# Patient Record
Sex: MALE | Race: WHITE | Employment: OTHER | URBAN - METROPOLITAN AREA
[De-identification: names, ages, dates, MRNs, and addresses within clinical notes are randomized per-mention and may not be internally consistent; named-entity substitution may affect disease eponyms.]

---

## 2017-01-21 ENCOUNTER — HOSPITAL ENCOUNTER (OUTPATIENT)
Dept: GENERAL RADIOLOGY | Age: 72
Discharge: HOME | End: 2017-01-21
Payer: MEDICARE

## 2017-01-21 DIAGNOSIS — M54.2 NECK PAIN: ICD-10-CM

## 2017-01-21 PROCEDURE — 72050 X-RAY EXAM NECK SPINE 4/5VWS: CPT

## 2017-01-23 NOTE — PROGRESS NOTES
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 I think PT can help him, but if symptoms worsen, need to look into an MRI.

## 2017-01-23 NOTE — PROGRESS NOTES
Went over results with pt and he expressed understanding. He stated he has been scheduled with pt office.

## 2017-01-26 ENCOUNTER — HOSPITAL ENCOUNTER (OUTPATIENT)
Dept: PHYSICAL THERAPY | Age: 72
Discharge: HOME | End: 2017-01-26
Payer: MEDICARE

## 2017-01-26 DIAGNOSIS — M54.2 NECK PAIN: ICD-10-CM

## 2017-01-26 PROCEDURE — 97140 MANUAL THERAPY 1/> REGIONS: CPT

## 2017-01-26 PROCEDURE — G8982 BODY POS GOAL STATUS: HCPCS

## 2017-01-26 PROCEDURE — 97161 PT EVAL LOW COMPLEX 20 MIN: CPT

## 2017-01-26 PROCEDURE — 97110 THERAPEUTIC EXERCISES: CPT

## 2017-01-26 PROCEDURE — G8981 BODY POS CURRENT STATUS: HCPCS

## 2017-01-26 NOTE — PROGRESS NOTES
Ambulatory/Rehab Services H2 Model Falls Risk Assessment    Risk Factor Pts. ·   Confusion/Disorientation/Impulsivity  []    4 ·   Symptomatic Depression  []   2 ·   Altered Elimination  []   1 ·   Dizziness/Vertigo  []   1 ·   Gender (Male)  [x]   1 ·   Any administered antiepileptics (anticonvulsants):  []   2 ·   Any administered benzodiazepines:  []   1 ·   Visual Impairment (specify):  []   1 ·   Portable Oxygen Use  []   1 ·   Orthostatic ? BP  []   1 ·   History of Recent Falls (within 3 mos.)  []   5     Ability to Rise from Chair (choose one) Pts. ·   Ability to rise in a single movement  []   0 ·   Pushes up, successful in one attempt  [x]   1 ·   Multiple attempts, but successful  []   3 ·   Unable to rise without assistance  []   4   Total: (5 or greater = High Risk) 2     Falls Prevention Plan:   []                Physical Limitations to Exercise (specify):   []                Mobility Assistance Device (type):   []                Exercise/Equipment Adaptation (specify):    ©2010 [facility] [address] Patent #[id number].  Federal Law prohibits the replication, distribution or use without written permission from [facility]

## 2017-01-26 NOTE — PROGRESS NOTES
Eddie Rock  :  [facility] at [address], [place], [address] [place] [address]  [phone]   [phone]         OUTPATIENT PHYSICAL THERAPY:Initial Assessment 2017    ICD-10: Treatment Diagnosis 1: neck pain (M54.2)              Treatment Diagnosis 2: cervical radiculopathy (M54.12)  Precautions/Allergies:   Percocet [oxycodone-acetaminophen]   Fall Risk Score: 2 (? 5 = High Risk)  MD Orders: Evaluate and Treat MEDICAL/REFERRING DIAGNOSIS:  Neck pain [M54.2]   DATE OF ONSET: beginning of 2017  REFERRING PHYSICIAN: Chelsea Combs NP  RETURN PHYSICIAN APPOINTMENT: not scheduled     INITIAL ASSESSMENT:  Mr. José Pak is a 66year old male presenting with neck pain and radicular pain into the top of his head and down to his shoulder that started in the beginning of 2017. He reported that he has been working on cars in a shop at home on a lift, which requires him to look up for long periods of time. He reports that looking up, especially for long periods of time or into full extension creates his neck and radiating pain the most.  He denies weakness and tingling at this time, but reports that he has been unable to work on cars like he used to due to the pain. He is eager to improve tolerance to this activity, as well as any other activities requiring extended extension of the neck and head. He is a good candidate for skilled intervention with services to include manual therapy, modalities as needed, therapeutic exercises, postural re-education and activity modification. PROBLEM LIST (Impacting functional limitations):  1. Decreased Strength  2. Decreased ADL/Functional Activities  3. Decreased Transfer Abilities  4. Decreased Activity Tolerance  5. Increased Fatigue  6. Decreased Flexibility/Joint Mobility INTERVENTIONS PLANNED:  1. Cold  2. Electrical Stimulation  3. Heat  4. Home Exercise Program (HEP)  5.  Manual Therapy  6. Neuromuscular Re-education/Strengthening  7. Range of Motion (ROM)  8. Therapeutic Exercise/Strengthening  9. Ultrasound (US)   TREATMENT PLAN:  Effective Dates: 1/26/2017 TO 3/9/2017. Frequency/Duration: 2 times a week for 6 weeks  GOALS: (Goals have been discussed and agreed upon with patient.)  Short-Term Functional Goals: Time Frame: 1/26/2017 to 2/16/2017  1. Patient demonstrates independence with home exercise program without verbal cueing provided by therapist.  2. Improve left arm and head radicular symptoms with performance of HEP and use of traction. 3. Improve posture with decreased forward head, forward shoulders, and thoracic kyphosis in sitting and standing. Discharge Goals: Time Frame: 1/26/2017 to 3/9/2017  1. Improve pain to 3/10 at the most with working on his cars at home, looking up for house repairs, and sleeping. 2. Improve strength of deep cervical flexors, scapular retractors, and thoracic spinal extensors to at least 4/5 in order to improve tolerance to working on cars at home. 3. Improve form with lifting/carrying/pushing/pulling activities, as well as neutral spine with overhead activities. 4. Improve tenderness to palpation and spasm of left cervical paraspinals, suboccipitals, and scalenes in order to decrease pain with sleeping. 5. Improve tolerance to looking up working on cars for up to 10 minutes before the need to rest due to neck pain. 6. Improve Neck Disability Index score to 2/50 from 8/50. Rehabilitation Potential For Stated Goals: Good  Regarding Rochell Apley Holcombe's therapy, I certify that the treatment plan above will be carried out by a therapist or under their direction. Thank you for this referral,  Nohemi Dee PT     Referring Physician Signature: Maite Grier NP              Date                    The information in this section was collected on 1/26/2017 (except where otherwise noted).   HISTORY:   History of Present Injury/Illness (Reason for Referral):  Mr. Dereck Gandhi is a 66year old male presenting with neck pain and radicular pain into the top of his head and down to his shoulder that started in the beginning of January 2017. He reported that he has been working on cars in a shop at home on a lift, which requires him to look up for long periods of time. He reports that looking up, especially for long periods of time or into full extension creates his neck and radiating pain the most.  He denies weakness and tingling at this time, but reports that he has been unable to work on cars like he used to due to the pain. He is eager to improve tolerance to this activity, as well as any other activities requiring extended extension of the neck and head. Past Medical History/Comorbidities:   Mr. Dereck Gandhi  has a past medical history of Cancer ([facility] [address] (2006); Chronic maxillary sinusitis (8/14/2015); Diverticulitis; H/O colostomy (11/2012); and Microscopic hematuria (8/14/2015). He also has no past medical history of Abuse or Headache(784.0). Mr. Dereck Gandhi  has a past surgical history that includes colostomy (11/2012); hernia repair (1970s); colostomy take down (1/31/13); tonsillectomy; carpal tunnel release; appendectomy (11/2012); and other surgical (4/2014). Social History/Living Environment:    Patient lives at home with wife and reports general independence to modified independence with household chores and ADLs. Requires assistance with moderate to heavy lifting and overhead activities due to neck pain. Prior Level of Function/Work/Activity:  Independent without dysfunction. 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 Dominant Side:         RIGHT  Current Medications:       Current Outpatient Prescriptions:     tamsulosin (FLOMAX) 0.4 mg capsule, Take 0.4 mg by mouth daily. , Disp: , Rfl:     finasteride (PROSCAR) 5 mg tablet, Take 5 mg by mouth nightly., Disp: , Rfl:    Date Last Reviewed:  1/26/2017   Number of Personal Factors/Comorbidities that affect the Plan of Care: 0: LOW COMPLEXITY   EXAMINATION:     Patient denies any increase of symptoms with coughing, sneezing or valsalva maneuver. Patient denies any headaches, changes in vision, dizziness, vertigo, nausea, drop attacks, black outs, tinnitus, dysphagia, dysarthria, LE symptoms or bowel/bladder dysfunction. Observation/Orthostatic Postural Assessment:  Patient sits with moderate forward head, forward shoulders, and thoracic kyphosis. He is able to reverse this with cuing but is unable to hold the position for long due to weakness and stiffness. No significant deformity is noted of the spine but patient is very rigid during conversation with cervical mobility especially to the left. Palpation:  Gross tenderness to palpation and spasm of left cervical paraspinals, scalenes, and suboccipitals. Flexibility severely limited of bilateral pectoralis minor and major. Left upper trapezius severely limited, right moderately. Left levator scapulae limited severely, right limited moderately. Suboccipitals limited severely bilaterally. Vertebral-Basilar Screen: Hautant's test is negative. Cranial extension test is negative. ROM:   Cervical extension (degrees): 40° with pain into left shoulder and neck   Cervical flexion: 60°   Cervical left side bend: 25° with neck pain   Cervical right side bend: 35°   Cervical left rotation: 30°   Cervical right rotation: 50°     Strength: With upper quarter screen grossly symmetrical and WNL.  on the right is 60 lbs, left is 50 lbs. Scapular retractors 3/5. Deep cervical flexors 3/5. Thoracic spinal extensors 3/5. Joint Mobility:  Severe limitations with traction, side glides and posterior to anterior glides not tested due to presence of radicular symptoms.     Special Tests:  Ligament stress tests performed through upper cervical spine for transverse ligament including Sharp-Gordo test is negative, and [place]-Axis shear test is negative. [place]-Axis side flexion test for integrity of alar ligament is negative. Spurling test is positive for neck pain and radicular pain into the left shoulder. Cervical distraction is positive for symptom relief. Neurovascular testing for thoracic outlet syndrome is negative. Neurological Screen:  Myotomes: Key muscle strength testing for bilateral UE is WNL. Dermatomes: Sensation testing through bilateral upper quadrants for light touch is WNL. Reflexes: Biceps (C5), brachioradialis (C6), and triceps (C7) are 1+, 1+ and 1+. Neural tension tests: Upper limb tension test is negative. Slump test is negative. Functional Mobility:  Patient is grossly independent to modified independence with most ADLs and household chores. He requires assistance with moderate to heavy lifting when working on cars with his grandson, and especially when looking up using lift in his shop. Body Structures Involved:  1. Nerves  2. Bones  3. Joints  4. Muscles  5. Ligaments Body Functions Affected:  1. Sensory/Pain  2. Neuromusculoskeletal Activities and Participation Affected:  1. General Tasks and Demands  2. Working on and restoring cars    Number of elements (examined above) that affect the Plan of Care: 3: MODERATE COMPLEXITY   CLINICAL PRESENTATION:   Presentation: Stable and uncomplicated: LOW COMPLEXITY   CLINICAL DECISION MAKING:   Outcome Measure: Tool Used: Neck Disability Index (NDI)  Score:  Initial: 8/50  Most Recent: X/50 (Date: -- )   Interpretation of Score: The Neck Disability Index is a revised form of the Oswestry Low Back Pain Index and is designed to measure the activities of daily living in person's with neck pain. Each section is scored on a 0-5 scale, 5 representing the greatest disability. The scores of each section are added together for a total score of 50.    Score 0 1-10 11-20 21-30 31-40 41-49 50   Modifier CH CI CJ CK CL CM CN ? Changing and Maintaining Body Position:     - CURRENT STATUS: CI - 1%-19% impaired, limited or restricted - upper limit    - GOAL STATUS: CI - 1%-19% impaired, limited or restricted - lower limit    - D/C STATUS:  ---------------To be determined---------------     Medical Necessity:   · Patient is expected to demonstrate progress in strength, range of motion, balance, coordination and functional technique to improve tolerance to lifting, carrying, pushing, and pulling as necessary to work on cars with his grandson. · Skilled intervention continues to be required due to weakness, decreased ROM, radicular symptoms, pain, postural dysfunction, and functional limitations. Reason for Services/Other Comments:  · Patient continues to require skilled intervention due to increasing complexity of exercises. Use of outcome tool(s) and clinical judgement create a POC that gives a: Clear prediction of patient's progress: LOW COMPLEXITY            TREATMENT:   (In addition to Assessment/Re-Assessment sessions the following treatments were rendered)    Pre-treatment Symptoms/Complaints:  Patient reports the worst pain is with looking up, especially for long periods of time. He has been unable to work on cars as usual with his grandson. Pain: Initial:   Pain Intensity 1: 7  Pain Location 1: Neck  Pain Orientation 1: Left  Post Session:  5/10     Therapeutic Exercise: (15 Minutes):  Exercises per grid below to improve mobility, strength, balance and coordination. Required minimal visual, verbal and manual cues to promote proper body alignment, promote proper body posture, promote proper body mechanics and promote proper body breathing techniques. Progressed resistance, range, repetitions and complexity of movement as indicated.   (used abbreviations: BET-back education training) Date:  1/26/2017 Date:   Date:     Activity/Exercise Parameters Parameters Parameters   Seated scapular retraction 2 x 10 Levator scapulae stretch 3 x 30 sec     Doorway stretch 3 x 30 sec                               Manual Therapy (    Soft Tissue Mobilization Duration  Duration: 15 Minutes): improve joint and soft tissue mobility  · Supine traction of the cervical spine  · Supine suboccipital traction with retraction of the cervical spine  · Left sided stretching of upper trapezius and levator scapulae  · Deep tissue mobilization of left sided cervical paraspinals and suboccipitals    (Used abbreviations: MET - muscle energy technique; PNF - proprioceptive neuromuscular facilitation; NMR - neuromuscular re-education; a/p - anterior to posterior; p/a - posterior to anterio; NT - not tested)    Therapeutic Modalities: for edema and pain                                                                                               HEP: As above; handouts given to patient for all exercises. Treatment/Session Assessment:    · Response to Treatment:  Patient reported less pain at the end of session and no radicular symptoms. He reported no complaints with exercises but was advised not to overdo his exercise intensity. He reported a good understanding of HEP. · Compliance with Program/Exercises: compliant most of the time. · Recommendations/Intent for next treatment session: \"Next visit will focus on advancements to more challenging activities\".     Total Treatment Duration: 60 minutes; 30 evaluation, 15 exercises, 15 manual therapy  PT Patient Time In/Time Out  Time In: 0950  Time Out: [address]

## 2017-01-27 ENCOUNTER — HOSPITAL ENCOUNTER (OUTPATIENT)
Dept: PHYSICAL THERAPY | Age: 72
Discharge: HOME | End: 2017-01-27
Payer: MEDICARE

## 2017-01-27 PROCEDURE — 97110 THERAPEUTIC EXERCISES: CPT

## 2017-01-27 PROCEDURE — 97140 MANUAL THERAPY 1/> REGIONS: CPT

## 2017-01-27 NOTE — PROGRESS NOTES
Tavia Deshpande  : [facility] at [address], [place], [address] [place] [address]  [phone]   [phone]         OUTPATIENT PHYSICAL THERAPY:Daily Note 2017    ICD-10: Treatment Diagnosis 1: neck pain (M54.2)              Treatment Diagnosis 2: cervical radiculopathy (M54.12)  Precautions/Allergies:   Percocet [oxycodone-acetaminophen]   Fall Risk Score: 2 (? 5 = High Risk)  MD Orders: Evaluate and Treat MEDICAL/REFERRING DIAGNOSIS:  Cervicalgia [M54.2]   DATE OF ONSET: beginning of 2017  REFERRING PHYSICIAN: Hernandez Johnson NP  RETURN PHYSICIAN APPOINTMENT: not scheduled     INITIAL ASSESSMENT:  Mr. Cesilia Kuo is a 66year old male presenting with neck pain and radicular pain into the top of his head and down to his shoulder that started in the beginning of 2017. He reported that he has been working on cars in a shop at home on a lift, which requires him to look up for long periods of time. He reports that looking up, especially for long periods of time or into full extension creates his neck and radiating pain the most.  He denies weakness and tingling at this time, but reports that he has been unable to work on cars like he used to due to the pain. He is eager to improve tolerance to this activity, as well as any other activities requiring extended extension of the neck and head. He is a good candidate for skilled intervention with services to include manual therapy, modalities as needed, therapeutic exercises, postural re-education and activity modification. PROBLEM LIST (Impacting functional limitations):  1. Decreased Strength  2. Decreased ADL/Functional Activities  3. Decreased Transfer Abilities  4. Decreased Activity Tolerance  5. Increased Fatigue  6. Decreased Flexibility/Joint Mobility INTERVENTIONS PLANNED:  1. Cold  2. Electrical Stimulation  3. Heat  4. Home Exercise Program (HEP)  5. Manual Therapy  6.  Neuromuscular Re-education/Strengthening  7. Range of Motion (ROM)  8. Therapeutic Exercise/Strengthening  9. Ultrasound (US)   TREATMENT PLAN:  Effective Dates: 1/26/2017 TO 3/9/2017. Frequency/Duration: 2 times a week for 6 weeks  GOALS: (Goals have been discussed and agreed upon with patient.)  Short-Term Functional Goals: Time Frame: 1/26/2017 to 2/16/2017  1. Patient demonstrates independence with home exercise program without verbal cueing provided by therapist. - GOAL MET  2. Improve left arm and head radicular symptoms with performance of HEP and use of traction. - ONGOING  3. Improve posture with decreased forward head, forward shoulders, and thoracic kyphosis in sitting and standing. - ONGOING  Discharge Goals: Time Frame: 1/26/2017 to 3/9/2017  1. Improve pain to 3/10 at the most with working on his cars at home, looking up for house repairs, and sleeping. 2. Improve strength of deep cervical flexors, scapular retractors, and thoracic spinal extensors to at least 4/5 in order to improve tolerance to working on cars at home. 3. Improve form with lifting/carrying/pushing/pulling activities, as well as neutral spine with overhead activities. 4. Improve tenderness to palpation and spasm of left cervical paraspinals, suboccipitals, and scalenes in order to decrease pain with sleeping. 5. Improve tolerance to looking up working on cars for up to 10 minutes before the need to rest due to neck pain. 6. Improve Neck Disability Index score to 2/50 from 8/50. Rehabilitation Potential For Stated Goals: Good  Regarding Lilian Dyer's therapy, I certify that the treatment plan above will be carried out by a therapist or under their direction. Thank you for this referral,  Magdiel Su PT     Referring Physician Signature: Emily Navarro NP              Date                    The information in this section was collected on 1/26/2017 (except where otherwise noted).   HISTORY:   History of Present Injury/Illness (Reason for Referral):  Mr. Buck Martinez is a 66year old male presenting with neck pain and radicular pain into the top of his head and down to his shoulder that started in the beginning of January 2017. He reported that he has been working on cars in a shop at home on a lift, which requires him to look up for long periods of time. He reports that looking up, especially for long periods of time or into full extension creates his neck and radiating pain the most.  He denies weakness and tingling at this time, but reports that he has been unable to work on cars like he used to due to the pain. He is eager to improve tolerance to this activity, as well as any other activities requiring extended extension of the neck and head. Past Medical History/Comorbidities:   Mr. Buck Martinez  has a past medical history of Cancer ([address] (2006); Chronic maxillary sinusitis (8/14/2015); Diverticulitis; H/O colostomy (11/2012); and Microscopic hematuria (8/14/2015). He also has no past medical history of Abuse or Headache(784.0). Mr. Buck Martinez  has a past surgical history that includes colostomy (11/2012); hernia repair (1970s); colostomy take down (1/31/13); tonsillectomy; carpal tunnel release; appendectomy (11/2012); and other surgical (4/2014). Social History/Living Environment:    Patient lives at home with wife and reports general independence to modified independence with household chores and ADLs. Requires assistance with moderate to heavy lifting and overhead activities due to neck pain. Prior Level of Function/Work/Activity:  Independent without dysfunction. 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 Dominant Side:         RIGHT  Current Medications:       Current Outpatient Prescriptions:     tamsulosin (FLOMAX) 0.4 mg capsule, Take 0.4 mg by mouth daily. , Disp: , Rfl:     finasteride (PROSCAR) 5 mg tablet, Take 5 mg by mouth nightly., Disp: , Rfl:    Date Last Reviewed:  1/27/2017   Number of Personal Factors/Comorbidities that affect the Plan of Care: 0: LOW COMPLEXITY   EXAMINATION:     Patient denies any increase of symptoms with coughing, sneezing or valsalva maneuver. Patient denies any headaches, changes in vision, dizziness, vertigo, nausea, drop attacks, black outs, tinnitus, dysphagia, dysarthria, LE symptoms or bowel/bladder dysfunction. Observation/Orthostatic Postural Assessment:  Patient sits with moderate forward head, forward shoulders, and thoracic kyphosis. He is able to reverse this with cuing but is unable to hold the position for long due to weakness and stiffness. No significant deformity is noted of the spine but patient is very rigid during conversation with cervical mobility especially to the left. Palpation:  Gross tenderness to palpation and spasm of left cervical paraspinals, scalenes, and suboccipitals. Flexibility severely limited of bilateral pectoralis minor and major. Left upper trapezius severely limited, right moderately. Left levator scapulae limited severely, right limited moderately. Suboccipitals limited severely bilaterally. Vertebral-Basilar Screen: Hautant's test is negative. Cranial extension test is negative. ROM:   Cervical extension (degrees): 40° with pain into left shoulder and neck   Cervical flexion: 60°   Cervical left side bend: 25° with neck pain   Cervical right side bend: 35°   Cervical left rotation: 30°   Cervical right rotation: 50°     Strength: With upper quarter screen grossly symmetrical and WNL.  on the right is 60 lbs, left is 50 lbs. Scapular retractors 3/5. Deep cervical flexors 3/5. Thoracic spinal extensors 3/5. Joint Mobility:  Severe limitations with traction, side glides and posterior to anterior glides not tested due to presence of radicular symptoms.     Special Tests:  Ligament stress tests performed through upper cervical spine for transverse ligament including Sharp-Gordo test is negative, and [place]-Axis shear test is negative. [place]-Axis side flexion test for integrity of alar ligament is negative. Spurling test is positive for neck pain and radicular pain into the left shoulder. Cervical distraction is positive for symptom relief. Neurovascular testing for thoracic outlet syndrome is negative. Neurological Screen:  Myotomes: Key muscle strength testing for bilateral UE is WNL. Dermatomes: Sensation testing through bilateral upper quadrants for light touch is WNL. Reflexes: Biceps (C5), brachioradialis (C6), and triceps (C7) are 1+, 1+ and 1+. Neural tension tests: Upper limb tension test is negative. Slump test is negative. Functional Mobility:  Patient is grossly independent to modified independence with most ADLs and household chores. He requires assistance with moderate to heavy lifting when working on cars with his grandson, and especially when looking up using lift in his shop. Body Structures Involved:  1. Nerves  2. Bones  3. Joints  4. Muscles  5. Ligaments Body Functions Affected:  1. Sensory/Pain  2. Neuromusculoskeletal Activities and Participation Affected:  1. General Tasks and Demands  2. Working on and restoring cars    Number of elements (examined above) that affect the Plan of Care: 3: MODERATE COMPLEXITY   CLINICAL PRESENTATION:   Presentation: Stable and uncomplicated: LOW COMPLEXITY   CLINICAL DECISION MAKING:   Outcome Measure: Tool Used: Neck Disability Index (NDI)  Score:  Initial: 8/50  Most Recent: X/50 (Date: -- )   Interpretation of Score: The Neck Disability Index is a revised form of the Oswestry Low Back Pain Index and is designed to measure the activities of daily living in person's with neck pain. Each section is scored on a 0-5 scale, 5 representing the greatest disability. The scores of each section are added together for a total score of 50.    Score 0 1-10 11-20 21-30 31-40 41-49 50   Modifier CH CI CJ CK CL CM CN ? Changing and Maintaining Body Position:     - CURRENT STATUS: CI - 1%-19% impaired, limited or restricted - upper limit    - GOAL STATUS: CI - 1%-19% impaired, limited or restricted - lower limit    - D/C STATUS:  ---------------To be determined---------------     Medical Necessity:   · Patient is expected to demonstrate progress in strength, range of motion, balance, coordination and functional technique to improve tolerance to lifting, carrying, pushing, and pulling as necessary to work on cars with his grandson. · Skilled intervention continues to be required due to weakness, decreased ROM, radicular symptoms, pain, postural dysfunction, and functional limitations. Reason for Services/Other Comments:  · Patient continues to require skilled intervention due to increasing complexity of exercises. Use of outcome tool(s) and clinical judgement create a POC that gives a: Clear prediction of patient's progress: LOW COMPLEXITY            TREATMENT:   (In addition to Assessment/Re-Assessment sessions the following treatments were rendered)    Pre-treatment Symptoms/Complaints:  Patient reported less pain overall but was very sore today from sleeping awkwardly and trying to get out of bed. Pain: Initial:   Pain Intensity 1: 6  Pain Location 1: Neck  Pain Orientation 1: Left  Post Session:  0/10     Therapeutic Exercise: (20 Minutes):  Exercises per grid below to improve mobility, strength, balance and coordination. Required minimal visual, verbal and manual cues to promote proper body alignment, promote proper body posture, promote proper body mechanics and promote proper body breathing techniques. Progressed resistance, range, repetitions and complexity of movement as indicated.   (used abbreviations: BET-back education training) Date:  1/26/2017 Date:  1/27/2017 Date:     Activity/Exercise Parameters Parameters Parameters   Arm bike --- 2 forward, 2 backward    Seated scapular retraction 2 x 10 3 x 10    Levator scapulae stretch 3 x 30 sec 3 x 30 sec    Doorway stretch 3 x 30 sec 3 x 30 sec    Upper thoracic stretch --- 3 x 30 sec                        Manual Therapy (    Soft Tissue Mobilization Duration  Duration: 35 Minutes): improve joint and soft tissue mobility  · Supine traction of the cervical spine  · Supine suboccipital traction with retraction of the cervical spine  · Left sided stretching of upper trapezius and levator scapulae  · Deep tissue mobilization of left sided cervical paraspinals and suboccipitals    (Used abbreviations: MET - muscle energy technique; PNF - proprioceptive neuromuscular facilitation; NMR - neuromuscular re-education; a/p - anterior to posterior; p/a - posterior to anterio; NT - not tested)    Therapeutic Modalities: for edema and pain               Cervical Spine Heat  Type: Moist pack  Duration: 10 minutes  Patient Position: Sitting                                                                               HEP: As above; handouts given to patient for all exercises. Treatment/Session Assessment:    · Response to Treatment:  Patient reported 0/10 pain at the end of session. Extensive education regarding neck symptoms, sleeping habits, and mechanics of cervical spine arthritis and minimization of extension. · Compliance with Program/Exercises: compliant most of the time. · Recommendations/Intent for next treatment session: \"Next visit will focus on advancements to more challenging activities\".     Total Treatment Duration: 65 minutes  PT Patient Time In/Time Out  Time In: 6020  Time Out: [address] Marlon Mathews PT

## 2017-01-30 ENCOUNTER — HOSPITAL ENCOUNTER (OUTPATIENT)
Dept: PHYSICAL THERAPY | Age: 72
Discharge: HOME | End: 2017-01-30
Payer: MEDICARE

## 2017-01-30 PROCEDURE — 97110 THERAPEUTIC EXERCISES: CPT

## 2017-01-30 PROCEDURE — 97140 MANUAL THERAPY 1/> REGIONS: CPT

## 2017-01-30 NOTE — PROGRESS NOTES
uL Nadir  :  Therapy Center at [address], [facility], [address] [place] [address] Street  [phone]   [phone]         OUTPATIENT PHYSICAL THERAPY:Daily Note 2017    ICD-10: Treatment Diagnosis 1: neck pain (M54.2)              Treatment Diagnosis 2: cervical radiculopathy (M54.12)  Precautions/Allergies:   Percocet [oxycodone-acetaminophen]   Fall Risk Score: 2 (? 5 = High Risk)  MD Orders: Evaluate and Treat MEDICAL/REFERRING DIAGNOSIS:  Cervicalgia [M54.2]   DATE OF ONSET: beginning of 2017  REFERRING PHYSICIAN: Dewayne Null NP  RETURN PHYSICIAN APPOINTMENT: not scheduled     INITIAL ASSESSMENT:  Mr. Jagruti Blum is a 66year old male presenting with neck pain and radicular pain into the top of his head and down to his shoulder that started in the beginning of 2017. He reported that he has been working on cars in a shop at home on a lift, which requires him to look up for long periods of time. He reports that looking up, especially for long periods of time or into full extension creates his neck and radiating pain the most.  He denies weakness and tingling at this time, but reports that he has been unable to work on cars like he used to due to the pain. He is eager to improve tolerance to this activity, as well as any other activities requiring extended extension of the neck and head. He is a good candidate for skilled intervention with services to include manual therapy, modalities as needed, therapeutic exercises, postural re-education and activity modification. PROBLEM LIST (Impacting functional limitations):  1. Decreased Strength  2. Decreased ADL/Functional Activities  3. Decreased Transfer Abilities  4. Decreased Activity Tolerance  5. Increased Fatigue  6. Decreased Flexibility/Joint Mobility INTERVENTIONS PLANNED:  1. Cold  2. Electrical Stimulation  3. Heat  4. Home Exercise Program (HEP)  5. Manual Therapy  6.  Neuromuscular Re-education/Strengthening  7. Range of Motion (ROM)  8. Therapeutic Exercise/Strengthening  9. Ultrasound (US)   TREATMENT PLAN:  Effective Dates: 1/26/2017 TO 3/9/2017. Frequency/Duration: 2 times a week for 6 weeks  GOALS: (Goals have been discussed and agreed upon with patient.)  Short-Term Functional Goals: Time Frame: 1/26/2017 to 2/16/2017  1. Patient demonstrates independence with home exercise program without verbal cueing provided by therapist. - GOAL MET  2. Improve left arm and head radicular symptoms with performance of HEP and use of traction. - ONGOING  3. Improve posture with decreased forward head, forward shoulders, and thoracic kyphosis in sitting and standing. - ONGOING  Discharge Goals: Time Frame: 1/26/2017 to 3/9/2017  1. Improve pain to 3/10 at the most with working on his cars at home, looking up for house repairs, and sleeping. 2. Improve strength of deep cervical flexors, scapular retractors, and thoracic spinal extensors to at least 4/5 in order to improve tolerance to working on cars at home. 3. Improve form with lifting/carrying/pushing/pulling activities, as well as neutral spine with overhead activities. 4. Improve tenderness to palpation and spasm of left cervical paraspinals, suboccipitals, and scalenes in order to decrease pain with sleeping. 5. Improve tolerance to looking up working on cars for up to 10 minutes before the need to rest due to neck pain. 6. Improve Neck Disability Index score to 2/50 from 8/50. Rehabilitation Potential For Stated Goals: Good  Regarding Ramila Dyer's therapy, I certify that the treatment plan above will be carried out by a therapist or under their direction. Thank you for this referral,  Robel Castañeda PTA     Referring Physician Signature: Tahmina Jules NP              Date                    The information in this section was collected on 1/26/2017 (except where otherwise noted).   HISTORY:   History of Present Injury/Illness (Reason for Referral):  Mr. Quan Sol is a 66year old male presenting with neck pain and radicular pain into the top of his head and down to his shoulder that started in the beginning of January 2017. He reported that he has been working on cars in a shop at home on a lift, which requires him to look up for long periods of time. He reports that looking up, especially for long periods of time or into full extension creates his neck and radiating pain the most.  He denies weakness and tingling at this time, but reports that he has been unable to work on cars like he used to due to the pain. He is eager to improve tolerance to this activity, as well as any other activities requiring extended extension of the neck and head. Past Medical History/Comorbidities:   Mr. Quan Sol  has a past medical history of Cancer ([facility] [address] (2006); Chronic maxillary sinusitis (8/14/2015); Diverticulitis; H/O colostomy (11/2012); and Microscopic hematuria (8/14/2015). He also has no past medical history of Abuse or Headache(784.0). Mr. Quan Sol  has a past surgical history that includes colostomy (11/2012); hernia repair (1970s); colostomy take down (1/31/13); tonsillectomy; carpal tunnel release; appendectomy (11/2012); and other surgical (4/2014). Social History/Living Environment:    Patient lives at home with wife and reports general independence to modified independence with household chores and ADLs. Requires assistance with moderate to heavy lifting and overhead activities due to neck pain. Prior Level of Function/Work/Activity:  Independent without dysfunction. 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 Dominant Side:         RIGHT  Current Medications:       Current Outpatient Prescriptions:     tamsulosin (FLOMAX) 0.4 mg capsule, Take 0.4 mg by mouth daily. , Disp: , Rfl:     finasteride (PROSCAR) 5 mg tablet, Take 5 mg by mouth nightly., Disp: , Rfl:    Date Last Reviewed:  1/30/2017   Number of Personal Factors/Comorbidities that affect the Plan of Care: 0: LOW COMPLEXITY   EXAMINATION:     Patient denies any increase of symptoms with coughing, sneezing or valsalva maneuver. Patient denies any headaches, changes in vision, dizziness, vertigo, nausea, drop attacks, black outs, tinnitus, dysphagia, dysarthria, LE symptoms or bowel/bladder dysfunction. Observation/Orthostatic Postural Assessment:  Patient sits with moderate forward head, forward shoulders, and thoracic kyphosis. He is able to reverse this with cuing but is unable to hold the position for long due to weakness and stiffness. No significant deformity is noted of the spine but patient is very rigid during conversation with cervical mobility especially to the left. Palpation:  Gross tenderness to palpation and spasm of left cervical paraspinals, scalenes, and suboccipitals. Flexibility severely limited of bilateral pectoralis minor and major. Left upper trapezius severely limited, right moderately. Left levator scapulae limited severely, right limited moderately. Suboccipitals limited severely bilaterally. Vertebral-Basilar Screen: Hautant's test is negative. Cranial extension test is negative. ROM:   Cervical extension (degrees): 40° with pain into left shoulder and neck   Cervical flexion: 60°   Cervical left side bend: 25° with neck pain   Cervical right side bend: 35°   Cervical left rotation: 30°   Cervical right rotation: 50°     Strength: With upper quarter screen grossly symmetrical and WNL.  on the right is 60 lbs, left is 50 lbs. Scapular retractors 3/5. Deep cervical flexors 3/5. Thoracic spinal extensors 3/5. Joint Mobility:  Severe limitations with traction, side glides and posterior to anterior glides not tested due to presence of radicular symptoms.     Special Tests:  Ligament stress tests performed through upper cervical spine for transverse ligament including Sharp-Gordo test is negative, and Ridgefield-Axis shear test is negative. Ridgefield-Axis side flexion test for integrity of alar ligament is negative. Spurling test is positive for neck pain and radicular pain into the left shoulder. Cervical distraction is positive for symptom relief. Neurovascular testing for thoracic outlet syndrome is negative. Neurological Screen:  Myotomes: Key muscle strength testing for bilateral UE is WNL. Dermatomes: Sensation testing through bilateral upper quadrants for light touch is WNL. Reflexes: Biceps (C5), brachioradialis (C6), and triceps (C7) are 1+, 1+ and 1+. Neural tension tests: Upper limb tension test is negative. Slump test is negative. Functional Mobility:  Patient is grossly independent to modified independence with most ADLs and household chores. He requires assistance with moderate to heavy lifting when working on cars with his grandson, and especially when looking up using lift in his shop. Body Structures Involved:  1. Nerves  2. Bones  3. Joints  4. Muscles  5. Ligaments Body Functions Affected:  1. Sensory/Pain  2. Neuromusculoskeletal Activities and Participation Affected:  1. General Tasks and Demands  2. Working on and restoring cars    Number of elements (examined above) that affect the Plan of Care: 3: MODERATE COMPLEXITY   CLINICAL PRESENTATION:   Presentation: Stable and uncomplicated: LOW COMPLEXITY   CLINICAL DECISION MAKING:   Outcome Measure: Tool Used: Neck Disability Index (NDI)  Score:  Initial: 8/50  Most Recent: X/50 (Date: -- )   Interpretation of Score: The Neck Disability Index is a revised form of the Oswestry Low Back Pain Index and is designed to measure the activities of daily living in person's with neck pain. Each section is scored on a 0-5 scale, 5 representing the greatest disability. The scores of each section are added together for a total score of 50.    Score 0 1-10 11-20 21-30 31-40 41-49 50   Modifier CH CI CJ CK CL CM CN ? Changing and Maintaining Body Position:     - CURRENT STATUS: CI - 1%-19% impaired, limited or restricted - upper limit    - GOAL STATUS: CI - 1%-19% impaired, limited or restricted - lower limit    - D/C STATUS:  ---------------To be determined---------------     Medical Necessity:   · Patient is expected to demonstrate progress in strength, range of motion, balance, coordination and functional technique to improve tolerance to lifting, carrying, pushing, and pulling as necessary to work on cars with his grandson. · Skilled intervention continues to be required due to weakness, decreased ROM, radicular symptoms, pain, postural dysfunction, and functional limitations. Reason for Services/Other Comments:  · Patient continues to require skilled intervention due to increasing complexity of exercises. Use of outcome tool(s) and clinical judgement create a POC that gives a: Clear prediction of patient's progress: LOW COMPLEXITY            TREATMENT:   (In addition to Assessment/Re-Assessment sessions the following treatments were rendered)    Pre-treatment Symptoms/Complaints:  Patient C/O intermittent popping and cracking with associated pain left cervical spine. Does report improvement since starting therapy. Pain: Initial:   Pain Intensity 1: 2  Pain Location 1: Neck  Pain Orientation 1: Left  Post Session:  0/10     Therapeutic Exercise: (15 Minutes):  Exercises per grid below to improve mobility, strength, balance and coordination. Required minimal visual, verbal and manual cues to promote proper body alignment, promote proper body posture, promote proper body mechanics and promote proper body breathing techniques. Progressed resistance, range, repetitions and complexity of movement as indicated.   (used abbreviations: BET-back education training) Date:  1/26/2017 Date:  1/27/2017 Date:  1-30-17   Activity/Exercise Parameters Parameters Parameters   Arm bike --- 2 forward, 2 backward 3 min forward  3 min back   Seated scapular retraction 2 x 10 3 x 10 X 10   Levator scapulae stretch 3 x 30 sec 3 x 30 sec 3 x 30 sec   Doorway stretch 3 x 30 sec 3 x 30 sec 3 x 30 sec   Upper thoracic stretch --- 3 x 30 sec 3 x 30 sec   Chest stretch   Hands clasp behind back 3 x 30 sec                 Manual Therapy (    Soft Tissue Mobilization Duration  Duration: 30 Minutes): improve joint and soft tissue mobility  · Supine traction of the cervical spine  · Supine suboccipital traction with retraction of the cervical spine  · Left sided stretching of upper trapezius and levator scapulae  · Deep tissue mobilization of left sided cervical paraspinals and suboccipitals in sitting. (Used abbreviations: MET - muscle energy technique; PNF - proprioceptive neuromuscular facilitation; NMR - neuromuscular re-education; a/p - anterior to posterior; p/a - posterior to anterio; NT - not tested)    Therapeutic Modalities: for edema and pain               Cervical Spine Heat  Type: Moist pack  Duration: 10 minutes  Patient Position: Sitting                                                                               HEP: As above; handouts given to patient for all exercises. Treatment/Session Assessment:    · Response to Treatment:  Patient reported 0/10 pain at the end of session. .  · Compliance with Program/Exercises: compliant most of the time. · Recommendations/Intent for next treatment session: \"Next visit will focus on advancements to more challenging activities\".     Total Treatment Duration: 55 minutes  PT Patient Time In/Time Out  Time In: 0800  Time Out: 0900    Ella Bhardwaj PTA

## 2017-02-03 ENCOUNTER — HOSPITAL ENCOUNTER (OUTPATIENT)
Dept: PHYSICAL THERAPY | Age: 72
Discharge: HOME | End: 2017-02-03
Payer: MEDICARE

## 2017-02-03 PROCEDURE — 97110 THERAPEUTIC EXERCISES: CPT

## 2017-02-03 PROCEDURE — 97140 MANUAL THERAPY 1/> REGIONS: CPT

## 2017-02-03 NOTE — PROGRESS NOTES
Cherylin Homans  :  [facility] at [address], [address]  [phone]   [phone]         OUTPATIENT PHYSICAL THERAPY:Daily Note 2/3/2017    ICD-10: Treatment Diagnosis 1: neck pain (M54.2)              Treatment Diagnosis 2: cervical radiculopathy (M54.12)  Precautions/Allergies:   Percocet [oxycodone-acetaminophen]   Fall Risk Score: 2 (? 5 = High Risk)  MD Orders: Evaluate and Treat MEDICAL/REFERRING DIAGNOSIS:  Cervicalgia [M54.2]   DATE OF ONSET: beginning of 2017  REFERRING PHYSICIAN: Fany Quintero NP  RETURN PHYSICIAN APPOINTMENT: not scheduled     INITIAL ASSESSMENT:  Mr. Xena Walsh is a 66year old male presenting with neck pain and radicular pain into the top of his head and down to his shoulder that started in the beginning of 2017. He reported that he has been working on cars in a shop at home on a lift, which requires him to look up for long periods of time. He reports that looking up, especially for long periods of time or into full extension creates his neck and radiating pain the most.  He denies weakness and tingling at this time, but reports that he has been unable to work on cars like he used to due to the pain. He is eager to improve tolerance to this activity, as well as any other activities requiring extended extension of the neck and head. He is a good candidate for skilled intervention with services to include manual therapy, modalities as needed, therapeutic exercises, postural re-education and activity modification. PROBLEM LIST (Impacting functional limitations):  1. Decreased Strength  2. Decreased ADL/Functional Activities  3. Decreased Transfer Abilities  4. Decreased Activity Tolerance  5. Increased Fatigue  6. Decreased Flexibility/Joint Mobility INTERVENTIONS PLANNED:  1. Cold  2. Electrical Stimulation  3. Heat  4. Home Exercise Program (HEP)  5. Manual Therapy  6.  Neuromuscular Re-education/Strengthening  7. Range of Motion (ROM)  8. Therapeutic Exercise/Strengthening  9. Ultrasound (US)   TREATMENT PLAN:  Effective Dates: 1/26/2017 TO 3/9/2017. Frequency/Duration: 2 times a week for 6 weeks  GOALS: (Goals have been discussed and agreed upon with patient.)  Short-Term Functional Goals: Time Frame: 1/26/2017 to 2/16/2017  1. Patient demonstrates independence with home exercise program without verbal cueing provided by therapist. - GOAL MET  2. Improve left arm and head radicular symptoms with performance of HEP and use of traction. - ONGOING  3. Improve posture with decreased forward head, forward shoulders, and thoracic kyphosis in sitting and standing. - ONGOING  Discharge Goals: Time Frame: 1/26/2017 to 3/9/2017  1. Improve pain to 3/10 at the most with working on his cars at home, looking up for house repairs, and sleeping. 2. Improve strength of deep cervical flexors, scapular retractors, and thoracic spinal extensors to at least 4/5 in order to improve tolerance to working on cars at home. 3. Improve form with lifting/carrying/pushing/pulling activities, as well as neutral spine with overhead activities. 4. Improve tenderness to palpation and spasm of left cervical paraspinals, suboccipitals, and scalenes in order to decrease pain with sleeping. 5. Improve tolerance to looking up working on cars for up to 10 minutes before the need to rest due to neck pain. 6. Improve Neck Disability Index score to 2/50 from 8/50. Rehabilitation Potential For Stated Goals: Good  Regarding Alesha Dyer's therapy, I certify that the treatment plan above will be carried out by a therapist or under their direction. Thank you for this referral,  Radha Odell PT     Referring Physician Signature: Louie Sena NP              Date                    The information in this section was collected on 1/26/2017 (except where otherwise noted).   HISTORY:   History of Present Injury/Illness (Reason for Referral):  Mr. Sarika Gay is a 66year old male presenting with neck pain and radicular pain into the top of his head and down to his shoulder that started in the beginning of January 2017. He reported that he has been working on cars in a shop at home on a lift, which requires him to look up for long periods of time. He reports that looking up, especially for long periods of time or into full extension creates his neck and radiating pain the most.  He denies weakness and tingling at this time, but reports that he has been unable to work on cars like he used to due to the pain. He is eager to improve tolerance to this activity, as well as any other activities requiring extended extension of the neck and head. Past Medical History/Comorbidities:   Mr. Sarika Gay  has a past medical history of Cancer ([facility] [address] (2006); Chronic maxillary sinusitis (8/14/2015); Diverticulitis; H/O colostomy (11/2012); and Microscopic hematuria (8/14/2015). He also has no past medical history of Abuse or Headache(784.0). Mr. Sarika Gay  has a past surgical history that includes colostomy (11/2012); hernia repair (1970s); colostomy take down (1/31/13); tonsillectomy; carpal tunnel release; appendectomy (11/2012); and other surgical (4/2014). Social History/Living Environment:    Patient lives at home with wife and reports general independence to modified independence with household chores and ADLs. Requires assistance with moderate to heavy lifting and overhead activities due to neck pain. Prior Level of Function/Work/Activity:  Independent without dysfunction. 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 Dominant Side:         RIGHT  Current Medications:       Current Outpatient Prescriptions:     tamsulosin (FLOMAX) 0.4 mg capsule, Take 0.4 mg by mouth daily. , Disp: , Rfl:     finasteride (PROSCAR) 5 mg tablet, Take 5 mg by mouth nightly., Disp: , Rfl:    Date Last Reviewed:  2/3/2017   Number of Personal Factors/Comorbidities that affect the Plan of Care: 0: LOW COMPLEXITY   EXAMINATION:     Patient denies any increase of symptoms with coughing, sneezing or valsalva maneuver. Patient denies any headaches, changes in vision, dizziness, vertigo, nausea, drop attacks, black outs, tinnitus, dysphagia, dysarthria, LE symptoms or bowel/bladder dysfunction. Observation/Orthostatic Postural Assessment:  Patient sits with moderate forward head, forward shoulders, and thoracic kyphosis. He is able to reverse this with cuing but is unable to hold the position for long due to weakness and stiffness. No significant deformity is noted of the spine but patient is very rigid during conversation with cervical mobility especially to the left. Palpation:  Gross tenderness to palpation and spasm of left cervical paraspinals, scalenes, and suboccipitals. Flexibility severely limited of bilateral pectoralis minor and major. Left upper trapezius severely limited, right moderately. Left levator scapulae limited severely, right limited moderately. Suboccipitals limited severely bilaterally. Vertebral-Basilar Screen: Hautant's test is negative. Cranial extension test is negative. ROM:   Cervical extension (degrees): 40° with pain into left shoulder and neck   Cervical flexion: 60°   Cervical left side bend: 25° with neck pain   Cervical right side bend: 35°   Cervical left rotation: 30°   Cervical right rotation: 50°     Strength: With upper quarter screen grossly symmetrical and WNL.  on the right is 60 lbs, left is 50 lbs. Scapular retractors 3/5. Deep cervical flexors 3/5. Thoracic spinal extensors 3/5. Joint Mobility:  Severe limitations with traction, side glides and posterior to anterior glides not tested due to presence of radicular symptoms.     Special Tests:  Ligament stress tests performed through upper cervical spine for transverse ligament including Sharp-Gordo test is negative, and [place]-Axis shear test is negative. [place]-Axis side flexion test for integrity of alar ligament is negative. Spurling test is positive for neck pain and radicular pain into the left shoulder. Cervical distraction is positive for symptom relief. Neurovascular testing for thoracic outlet syndrome is negative. Neurological Screen:  Myotomes: Key muscle strength testing for bilateral UE is WNL. Dermatomes: Sensation testing through bilateral upper quadrants for light touch is WNL. Reflexes: Biceps (C5), brachioradialis (C6), and triceps (C7) are 1+, 1+ and 1+. Neural tension tests: Upper limb tension test is negative. Slump test is negative. Functional Mobility:  Patient is grossly independent to modified independence with most ADLs and household chores. He requires assistance with moderate to heavy lifting when working on cars with his grandson, and especially when looking up using lift in his shop. Body Structures Involved:  1. Nerves  2. Bones  3. Joints  4. Muscles  5. Ligaments Body Functions Affected:  1. Sensory/Pain  2. Neuromusculoskeletal Activities and Participation Affected:  1. General Tasks and Demands  2. Working on and restoring cars    Number of elements (examined above) that affect the Plan of Care: 3: MODERATE COMPLEXITY   CLINICAL PRESENTATION:   Presentation: Stable and uncomplicated: LOW COMPLEXITY   CLINICAL DECISION MAKING:   Outcome Measure: Tool Used: Neck Disability Index (NDI)  Score:  Initial: 8/50  Most Recent: X/50 (Date: -- )   Interpretation of Score: The Neck Disability Index is a revised form of the Oswestry Low Back Pain Index and is designed to measure the activities of daily living in person's with neck pain. Each section is scored on a 0-5 scale, 5 representing the greatest disability. The scores of each section are added together for a total score of 50.    Score 0 1-10 11-20 21-30 31-40 41-49 50   Modifier CH CI CJ CK CL CM CN ? Changing and Maintaining Body Position:     - CURRENT STATUS: CI - 1%-19% impaired, limited or restricted - upper limit    - GOAL STATUS: CI - 1%-19% impaired, limited or restricted - lower limit    - D/C STATUS:  ---------------To be determined---------------     Medical Necessity:   · Patient is expected to demonstrate progress in strength, range of motion, balance, coordination and functional technique to improve tolerance to lifting, carrying, pushing, and pulling as necessary to work on cars with his grandson. · Skilled intervention continues to be required due to weakness, decreased ROM, radicular symptoms, pain, postural dysfunction, and functional limitations. Reason for Services/Other Comments:  · Patient continues to require skilled intervention due to increasing complexity of exercises. Use of outcome tool(s) and clinical judgement create a POC that gives a: Clear prediction of patient's progress: LOW COMPLEXITY            TREATMENT:   (In addition to Assessment/Re-Assessment sessions the following treatments were rendered)    Pre-treatment Symptoms/Complaints:  Patient reported significant pain after over stretching with exercises before bed a couple nights ago. Pain: Initial:   Pain Intensity 1: 3  Pain Location 1: Neck  Pain Orientation 1: Left  Post Session:  0/10     Therapeutic Exercise: (15 Minutes):  Exercises per grid below to improve mobility, strength, balance and coordination. Required minimal visual, verbal and manual cues to promote proper body alignment, promote proper body posture, promote proper body mechanics and promote proper body breathing techniques. Progressed resistance, range, repetitions and complexity of movement as indicated.   (used abbreviations: BET-back education training) Date:  2/3/2017 Date:  1/27/2017 Date:  1-30-17   Activity/Exercise Parameters Parameters Parameters   Arm bike 2 forward, 2 backward 2 forward, 2 backward 3 min forward  3 min back   Seated scapular retraction 3 x 10 3 x 10 X 10   Levator scapulae stretch 3 x 30 sec 3 x 30 sec 3 x 30 sec   Doorway stretch 3 x 30 sec 3 x 30 sec 3 x 30 sec   Upper thoracic stretch 3 x 30 sec 3 x 30 sec 3 x 30 sec   Chest stretch 3 x 30 sec  Hands clasp behind back 3 x 30 sec                 Manual Therapy (    Soft Tissue Mobilization Duration  Duration: 40 Minutes): improve joint and soft tissue mobility  · Supine traction of the cervical spine  · Supine suboccipital traction with retraction of the cervical spine  · Left sided stretching of upper trapezius and levator scapulae  · Deep tissue mobilization of left sided cervical paraspinals and suboccipitals in sitting. · Left inferior 1st rib mobilizations with scalenes stretching  (Used abbreviations: MET - muscle energy technique; PNF - proprioceptive neuromuscular facilitation; NMR - neuromuscular re-education; a/p - anterior to posterior; p/a - posterior to anterio; NT - not tested)    Therapeutic Modalities: for edema and pain               Cervical Spine Heat  Type: Moist pack  Duration: 10 minutes  Patient Position: Sitting                                                                               HEP: As above; handouts given to patient for all exercises. Treatment/Session Assessment:    · Response to Treatment:  Patient reported 0/10 pain at the end of session. Patient has a tendency to extend his neck inadvertently with exercise and during conversation - may be a product of his bifocals but he was advised to watch for this and minimize the time he spends with his head and neck in extension. · Compliance with Program/Exercises: compliant most of the time. · Recommendations/Intent for next treatment session: \"Next visit will focus on advancements to more challenging activities\".     Total Treatment Duration: 65 minutes  PT Patient Time In/Time Out  Time In: 0188  Time Out: [address], PT

## 2017-02-06 ENCOUNTER — HOSPITAL ENCOUNTER (OUTPATIENT)
Dept: PHYSICAL THERAPY | Age: 72
Discharge: HOME | End: 2017-02-06
Payer: MEDICARE

## 2017-02-06 PROCEDURE — 97140 MANUAL THERAPY 1/> REGIONS: CPT

## 2017-02-06 PROCEDURE — 97110 THERAPEUTIC EXERCISES: CPT

## 2017-02-06 NOTE — PROGRESS NOTES
Lilliam Player  : [facility] [address], [place], [address] [place] [address]  [phone]   [phone]         OUTPATIENT PHYSICAL THERAPY:Daily Note 2017    ICD-10: Treatment Diagnosis 1: neck pain (M54.2)              Treatment Diagnosis 2: cervical radiculopathy (M54.12)  Precautions/Allergies:   Percocet [oxycodone-acetaminophen]   Fall Risk Score: 2 (? 5 = High Risk)  MD Orders: Evaluate and Treat MEDICAL/REFERRING DIAGNOSIS:  Cervicalgia [M54.2]   DATE OF ONSET: beginning of 2017  REFERRING PHYSICIAN: Wiley Rashid NP  RETURN PHYSICIAN APPOINTMENT: not scheduled     INITIAL ASSESSMENT:  Mr. Alfredo Muñoz is a 66year old male presenting with neck pain and radicular pain into the top of his head and down to his shoulder that started in the beginning of 2017. He reported that he has been working on cars in a shop at home on a lift, which requires him to look up for long periods of time. He reports that looking up, especially for long periods of time or into full extension creates his neck and radiating pain the most.  He denies weakness and tingling at this time, but reports that he has been unable to work on cars like he used to due to the pain. He is eager to improve tolerance to this activity, as well as any other activities requiring extended extension of the neck and head. He is a good candidate for skilled intervention with services to include manual therapy, modalities as needed, therapeutic exercises, postural re-education and activity modification. PROBLEM LIST (Impacting functional limitations):  1. Decreased Strength  2. Decreased ADL/Functional Activities  3. Decreased Transfer Abilities  4. Decreased Activity Tolerance  5. Increased Fatigue  6. Decreased Flexibility/Joint Mobility INTERVENTIONS PLANNED:  1. Cold  2. Electrical Stimulation  3. Heat  4. Home Exercise Program (HEP)  5. Manual Therapy  6.  Neuromuscular Re-education/Strengthening  7. Range of Motion (ROM)  8. Therapeutic Exercise/Strengthening  9. Ultrasound (US)   TREATMENT PLAN:  Effective Dates: 1/26/2017 TO 3/9/2017. Frequency/Duration: 2 times a week for 6 weeks  GOALS: (Goals have been discussed and agreed upon with patient.)  Short-Term Functional Goals: Time Frame: 1/26/2017 to 2/16/2017  1. Patient demonstrates independence with home exercise program without verbal cueing provided by therapist. - GOAL MET  2. Improve left arm and head radicular symptoms with performance of HEP and use of traction. - ONGOING  3. Improve posture with decreased forward head, forward shoulders, and thoracic kyphosis in sitting and standing. - ONGOING  Discharge Goals: Time Frame: 1/26/2017 to 3/9/2017  1. Improve pain to 3/10 at the most with working on his cars at home, looking up for house repairs, and sleeping. 2. Improve strength of deep cervical flexors, scapular retractors, and thoracic spinal extensors to at least 4/5 in order to improve tolerance to working on cars at home. 3. Improve form with lifting/carrying/pushing/pulling activities, as well as neutral spine with overhead activities. 4. Improve tenderness to palpation and spasm of left cervical paraspinals, suboccipitals, and scalenes in order to decrease pain with sleeping. 5. Improve tolerance to looking up working on cars for up to 10 minutes before the need to rest due to neck pain. 6. Improve Neck Disability Index score to 2/50 from 8/50. Rehabilitation Potential For Stated Goals: Good  Regarding Kelly Dyer's therapy, I certify that the treatment plan above will be carried out by a therapist or under their direction. Thank you for this referral,  Sae Melendez PTA     Referring Physician Signature: Kasi Claudio NP              Date                    The information in this section was collected on 1/26/2017 (except where otherwise noted).   HISTORY:   History of Present Injury/Illness (Reason for Referral):  Mr. Karissa Grullon is a 66year old male presenting with neck pain and radicular pain into the top of his head and down to his shoulder that started in the beginning of January 2017. He reported that he has been working on cars in a shop at home on a lift, which requires him to look up for long periods of time. He reports that looking up, especially for long periods of time or into full extension creates his neck and radiating pain the most.  He denies weakness and tingling at this time, but reports that he has been unable to work on cars like he used to due to the pain. He is eager to improve tolerance to this activity, as well as any other activities requiring extended extension of the neck and head. Past Medical History/Comorbidities:   Mr. Karissa Grullon  has a past medical history of Cancer ([facility] [address] (2006); Chronic maxillary sinusitis (8/14/2015); Diverticulitis; H/O colostomy (11/2012); and Microscopic hematuria (8/14/2015). He also has no past medical history of Abuse or Headache(784.0). Mr. Karissa Grullon  has a past surgical history that includes colostomy (11/2012); hernia repair (1970s); colostomy take down (1/31/13); tonsillectomy; carpal tunnel release; appendectomy (11/2012); and other surgical (4/2014). Social History/Living Environment:    Patient lives at home with wife and reports general independence to modified independence with household chores and ADLs. Requires assistance with moderate to heavy lifting and overhead activities due to neck pain. Prior Level of Function/Work/Activity:  Independent without dysfunction. 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 Dominant Side:         RIGHT  Current Medications:       Current Outpatient Prescriptions:     tamsulosin (FLOMAX) 0.4 mg capsule, Take 0.4 mg by mouth daily. , Disp: , Rfl:     finasteride (PROSCAR) 5 mg tablet, Take 5 mg by mouth nightly., Disp: , Rfl:    Date Last Reviewed:  2/6/2017   Number of Personal Factors/Comorbidities that affect the Plan of Care: 0: LOW COMPLEXITY   EXAMINATION:     Patient denies any increase of symptoms with coughing, sneezing or valsalva maneuver. Patient denies any headaches, changes in vision, dizziness, vertigo, nausea, drop attacks, black outs, tinnitus, dysphagia, dysarthria, LE symptoms or bowel/bladder dysfunction. Observation/Orthostatic Postural Assessment:  Patient sits with moderate forward head, forward shoulders, and thoracic kyphosis. He is able to reverse this with cuing but is unable to hold the position for long due to weakness and stiffness. No significant deformity is noted of the spine but patient is very rigid during conversation with cervical mobility especially to the left. Palpation:  Gross tenderness to palpation and spasm of left cervical paraspinals, scalenes, and suboccipitals. Flexibility severely limited of bilateral pectoralis minor and major. Left upper trapezius severely limited, right moderately. Left levator scapulae limited severely, right limited moderately. Suboccipitals limited severely bilaterally. Vertebral-Basilar Screen: Hautant's test is negative. Cranial extension test is negative. ROM:   Cervical extension (degrees): 40° with pain into left shoulder and neck   Cervical flexion: 60°   Cervical left side bend: 25° with neck pain   Cervical right side bend: 35°   Cervical left rotation: 30°   Cervical right rotation: 50°     Strength: With upper quarter screen grossly symmetrical and WNL.  on the right is 60 lbs, left is 50 lbs. Scapular retractors 3/5. Deep cervical flexors 3/5. Thoracic spinal extensors 3/5. Joint Mobility:  Severe limitations with traction, side glides and posterior to anterior glides not tested due to presence of radicular symptoms.     Special Tests:  Ligament stress tests performed through upper cervical spine for transverse ligament including Sharp-Gordo test is negative, and [place]-Axis shear test is negative. [place]-Axis side flexion test for integrity of alar ligament is negative. Spurling test is positive for neck pain and radicular pain into the left shoulder. Cervical distraction is positive for symptom relief. Neurovascular testing for thoracic outlet syndrome is negative. Neurological Screen:  Myotomes: Key muscle strength testing for bilateral UE is WNL. Dermatomes: Sensation testing through bilateral upper quadrants for light touch is WNL. Reflexes: Biceps (C5), brachioradialis (C6), and triceps (C7) are 1+, 1+ and 1+. Neural tension tests: Upper limb tension test is negative. Slump test is negative. Functional Mobility:  Patient is grossly independent to modified independence with most ADLs and household chores. He requires assistance with moderate to heavy lifting when working on cars with his grandson, and especially when looking up using lift in his shop. Body Structures Involved:  1. Nerves  2. Bones  3. Joints  4. Muscles  5. Ligaments Body Functions Affected:  1. Sensory/Pain  2. Neuromusculoskeletal Activities and Participation Affected:  1. General Tasks and Demands  2. Working on and restoring cars    Number of elements (examined above) that affect the Plan of Care: 3: MODERATE COMPLEXITY   CLINICAL PRESENTATION:   Presentation: Stable and uncomplicated: LOW COMPLEXITY   CLINICAL DECISION MAKING:   Outcome Measure: Tool Used: Neck Disability Index (NDI)  Score:  Initial: 8/50  Most Recent: X/50 (Date: -- )   Interpretation of Score: The Neck Disability Index is a revised form of the Oswestry Low Back Pain Index and is designed to measure the activities of daily living in person's with neck pain. Each section is scored on a 0-5 scale, 5 representing the greatest disability. The scores of each section are added together for a total score of 50.    Score 0 1-10 11-20 21-30 31-40 41-49 50   Modifier CH CI CJ CK CL CM CN ? Changing and Maintaining Body Position:     - CURRENT STATUS: CI - 1%-19% impaired, limited or restricted - upper limit    - GOAL STATUS: CI - 1%-19% impaired, limited or restricted - lower limit    - D/C STATUS:  ---------------To be determined---------------     Medical Necessity:   · Patient is expected to demonstrate progress in strength, range of motion, balance, coordination and functional technique to improve tolerance to lifting, carrying, pushing, and pulling as necessary to work on cars with his grandson. · Skilled intervention continues to be required due to weakness, decreased ROM, radicular symptoms, pain, postural dysfunction, and functional limitations. Reason for Services/Other Comments:  · Patient continues to require skilled intervention due to increasing complexity of exercises. Use of outcome tool(s) and clinical judgement create a POC that gives a: Clear prediction of patient's progress: LOW COMPLEXITY            TREATMENT:   (In addition to Assessment/Re-Assessment sessions the following treatments were rendered)    Pre-treatment Symptoms/Complaints:  Patient reports increased left cervical pain and popping since this morning. States prior to that he was improving. .      Pain: Initial:   Pain Intensity 1: 4  Pain Location 1: Neck  Post Session:  1/10     Therapeutic Exercise: (5 Minutes):  Exercises per grid below to improve mobility, strength, balance and coordination. Required minimal visual, verbal and manual cues to promote proper body alignment, promote proper body posture, promote proper body mechanics and promote proper body breathing techniques. Progressed resistance, range, repetitions and complexity of movement as indicated.   (used abbreviations: BET-back education training) Date:  2/3/2017 Date:  2-6-17 Date:  1-30-17   Activity/Exercise Parameters Parameters Parameters   Arm bike 2 forward, 2 backward  3 min forward  3 min back   Seated scapular retraction 3 x 10 2x 10 X 10   Levator scapulae stretch 3 x 30 sec 2 x 30 sec 3 x 30 sec   Doorway stretch 3 x 30 sec  3 x 30 sec   Upper thoracic stretch 3 x 30 sec  3 x 30 sec   Chest stretch 3 x 30 sec 2 x 30 sec Hands clasp behind back 3 x 30 sec                 Manual Therapy (    Soft Tissue Mobilization Duration  Duration: 40 Minutes): improve joint and soft tissue mobility  · Supine traction of the cervical spine  · Supine suboccipital traction with retraction of the cervical spine  · Left sided stretching of upper trapezius and levator scapulae  · Deep tissue mobilization of left sided cervical paraspinals and suboccipitals in sitting. · Left inferior 1st rib mobilizations with scalenes stretching  (Used abbreviations: MET - muscle energy technique; PNF - proprioceptive neuromuscular facilitation; NMR - neuromuscular re-education; a/p - anterior to posterior; p/a - posterior to anterio; NT - not tested)    Therapeutic Modalities: for edema and pain               Cervical Spine Heat  Type: Moist pack  Duration: 10 minutes  Patient Position: Sitting                                                                               HEP: As above; handouts given to patient for all exercises. Treatment/Session Assessment:    · Response to Treatment:  Patient reported1/10 pain at the end of session. .  · Compliance with Program/Exercises: compliant most of the time. · Recommendations/Intent for next treatment session: \"Next visit will focus on advancements to more challenging activities\".     Total Treatment Duration: 55 minutes  PT Patient Time In/Time Out  Time In: 0900  Time Out: [address], PTA

## 2017-02-10 ENCOUNTER — HOSPITAL ENCOUNTER (OUTPATIENT)
Dept: PHYSICAL THERAPY | Age: 72
Discharge: HOME | End: 2017-02-10
Payer: MEDICARE

## 2017-02-10 PROCEDURE — 97140 MANUAL THERAPY 1/> REGIONS: CPT

## 2017-02-10 PROCEDURE — 97110 THERAPEUTIC EXERCISES: CPT

## 2017-02-10 PROCEDURE — G8982 BODY POS GOAL STATUS: HCPCS

## 2017-02-10 PROCEDURE — G8981 BODY POS CURRENT STATUS: HCPCS

## 2017-02-10 NOTE — PROGRESS NOTES
Cherylin Homans  :  [facility] at [address], [place], [address] [place] [address]  [phone]   [phone]         OUTPATIENT PHYSICAL THERAPY:Daily Note and Progress Report 2/10/2017    ICD-10: Treatment Diagnosis 1: neck pain (M54.2)              Treatment Diagnosis 2: cervical radiculopathy (M54.12)  Precautions/Allergies:   Percocet [oxycodone-acetaminophen]   Fall Risk Score: 2 (? 5 = High Risk)  MD Orders: Evaluate and Treat MEDICAL/REFERRING DIAGNOSIS:  Cervicalgia [M54.2]   DATE OF ONSET: beginning of 2017  REFERRING PHYSICIAN: Fany Quintero NP  RETURN PHYSICIAN APPOINTMENT: not scheduled     INITIAL ASSESSMENT:  Mr. Xena Walsh is a 66year old male presenting with neck pain and radicular pain into the top of his head and down to his shoulder that started in the beginning of 2017. He reported that he has been working on cars in a shop at home on a lift, which requires him to look up for long periods of time. Patient has been seen for 6 sessions of therapy from 2017 to 2/10/2017 with significant success. He reports significant improvements with working on his cars, sleeping, and daily activities. However, he still experiences intermittent spasm and pain on the left side especially when sleeping at times and looking up for long periods of times. He is eager to improve tolerance to working on his cars, which he does almost every day. He is a good candidate for continued skilled intervention with services to include manual therapy, modalities as needed, therapeutic exercises, postural re-education and activity modification. PROBLEM LIST (Impacting functional limitations):  1. Decreased Strength  2. Decreased ADL/Functional Activities  3. Decreased Transfer Abilities  4. Decreased Activity Tolerance  5. Increased Fatigue  6. Decreased Flexibility/Joint Mobility INTERVENTIONS PLANNED:  1. Cold  2.  Electrical Stimulation  3. Heat  4. Home Exercise Program (HEP)  5. Manual Therapy  6. Neuromuscular Re-education/Strengthening  7. Range of Motion (ROM)  8. Therapeutic Exercise/Strengthening  9. Ultrasound (US)   TREATMENT PLAN:  Effective Dates: 1/26/2017 TO 3/9/2017. Frequency/Duration: 2 times a week for 6 weeks  GOALS: (Goals have been discussed and agreed upon with patient.)  Short-Term Functional Goals: Time Frame: 1/26/2017 to 2/16/2017  1. Patient demonstrates independence with home exercise program without verbal cueing provided by therapist. - GOAL MET  2. Improve left arm and head radicular symptoms with performance of HEP and use of traction. - GOAL MET  3. Improve posture with decreased forward head, forward shoulders, and thoracic kyphosis in sitting and standing. - GOAL MET  Discharge Goals: Time Frame: 1/26/2017 to 3/9/2017  1. Improve pain to 3/10 at the most with working on his cars at home, looking up for house repairs, and sleeping. - ONGOING  2. Improve strength of deep cervical flexors, scapular retractors, and thoracic spinal extensors to at least 4/5 in order to improve tolerance to working on cars at home. - ONGOING  3. Improve form with lifting/carrying/pushing/pulling activities, as well as neutral spine with overhead activities. - ONGOING  4. Improve tenderness to palpation and spasm of left cervical paraspinals, suboccipitals, and scalenes in order to decrease pain with sleeping. - ONGOING  5. Improve tolerance to looking up working on cars for up to 10 minutes before the need to rest due to neck pain. - ONGOING  6. Improve Neck Disability Index score to 2/50 from 8/50. - ONGOING  Rehabilitation Potential For Stated Goals: Good  Regarding Lanell Osgood Holcombe's therapy, I certify that the treatment plan above will be carried out by a therapist or under their direction.   Thank you for this referral,  Liz Gibbons PT     Referring Physician Signature: Omer Browning NP              Date The information in this section was collected on 1/26/2017 (except where otherwise noted). HISTORY:   History of Present Injury/Illness (Reason for Referral):  Mr. José Pak is a 66year old male presenting with neck pain and radicular pain into the top of his head and down to his shoulder that started in the beginning of January 2017. He reported that he has been working on cars in a shop at home on a lift, which requires him to look up for long periods of time. He reports that looking up, especially for long periods of time or into full extension creates his neck and radiating pain the most.  He denies weakness and tingling at this time, but reports that he has been unable to work on cars like he used to due to the pain. He is eager to improve tolerance to this activity, as well as any other activities requiring extended extension of the neck and head. Past Medical History/Comorbidities:   Mr. José Pak  has a past medical history of Cancer ([facility] [address] (2006); Chronic maxillary sinusitis (8/14/2015); Diverticulitis; H/O colostomy (11/2012); and Microscopic hematuria (8/14/2015). He also has no past medical history of Abuse or Headache(784.0). Mr. José aPk  has a past surgical history that includes colostomy (11/2012); hernia repair (1970s); colostomy take down (1/31/13); tonsillectomy; carpal tunnel release; appendectomy (11/2012); and other surgical (4/2014). Social History/Living Environment:    Patient lives at home with wife and reports general independence to modified independence with household chores and ADLs. Requires assistance with moderate to heavy lifting and overhead activities due to neck pain. Prior Level of Function/Work/Activity:  Independent without dysfunction. 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   Dominant Side:         RIGHT  Current Medications:       Current Outpatient Prescriptions:     tamsulosin (FLOMAX) 0.4 mg capsule, Take 0.4 mg by mouth daily. , Disp: , Rfl:     finasteride (PROSCAR) 5 mg tablet, Take 5 mg by mouth nightly., Disp: , Rfl:    Date Last Reviewed:  2/10/2017   Number of Personal Factors/Comorbidities that affect the Plan of Care: 0: LOW COMPLEXITY   EXAMINATION:     Patient denies any increase of symptoms with coughing, sneezing or valsalva maneuver. Patient denies any headaches, changes in vision, dizziness, vertigo, nausea, drop attacks, black outs, tinnitus, dysphagia, dysarthria, LE symptoms or bowel/bladder dysfunction. Observation/Orthostatic Postural Assessment:  Patient sits with minimal (From moderate forward head, forward shoulders, and thoracic kyphosis). He is able to reverse this with cuing and is able to hold it for longer periods of time with and without cuing (From is unable to hold the position for long due to weakness and stiffness). He still has difficulty with neutral head positioning but is able to hold the position with verbal and manual cuing. No significant deformity is noted of the spine but patient is very rigid during conversation with cervical mobility especially to the left. Palpation:  Gross tenderness to palpation and spasm of left cervical paraspinals, scalenes, and suboccipitals. Flexibility moderately (From severely) limited of bilateral pectoralis minor and major. Left upper trapezius moderately (From severely) limited, right moderately. Left levator scapulae limited moderately (From severely), right limited moderately. Suboccipitals limited moderately (From severely) bilaterally. Vertebral-Basilar Screen: Hautant's test is negative. Cranial extension test is negative. ROM:   Cervical extension (degrees): 40° with pain into left shoulder and neck   Cervical flexion: 60°   Cervical left side bend: 25° with neck pain   Cervical right side bend: 35°   Cervical left rotation: 30°   Cervical right rotation: 50°     Strength:  With upper quarter screen grossly symmetrical and WNL.  on the right is 60 lbs, left is 50 lbs. Scapular retractors 4/5 (From 3/5). Deep cervical flexors 3/5. Thoracic spinal extensors 3/5. Joint Mobility:  Moderate (From severe) limitations with traction, side glides and posterior to anterior glides not tested due to presence of radicular symptoms. Special Tests:  Ligament stress tests performed through upper cervical spine for transverse ligament including Sharp-Gordo test is negative, and [place]-Axis shear test is negative. [place]-Axis side flexion test for integrity of alar ligament is negative. Spurling test is positive for neck pain and radicular pain into the left shoulder. Cervical distraction is positive for symptom relief. Neurovascular testing for thoracic outlet syndrome is negative. Neurological Screen:  Myotomes: Key muscle strength testing for bilateral UE is WNL. Dermatomes: Sensation testing through bilateral upper quadrants for light touch is WNL. Reflexes: Biceps (C5), brachioradialis (C6), and triceps (C7) are 1+, 1+ and 1+. Neural tension tests: Upper limb tension test is negative. Slump test is negative. Functional Mobility:  Patient is grossly independent to modified independence with most ADLs and household chores. He requires assistance with moderate to heavy lifting when working on cars with his grandson, and especially when looking up using lift in his shop. Body Structures Involved:  1. Nerves  2. Bones  3. Joints  4. Muscles  5. Ligaments Body Functions Affected:  1. Sensory/Pain  2. Neuromusculoskeletal Activities and Participation Affected:  1. General Tasks and Demands  2. Working on and restoring cars    Number of elements (examined above) that affect the Plan of Care: 3: MODERATE COMPLEXITY   CLINICAL PRESENTATION:   Presentation: Stable and uncomplicated: LOW COMPLEXITY   CLINICAL DECISION MAKING:   Outcome Measure:    Tool Used: Neck Disability Index (NDI)  Score:  Initial: 8/50  Most Recent: 9/50 (Date: 2/10/2017)   Interpretation of Score: The Neck Disability Index is a revised form of the Oswestry Low Back Pain Index and is designed to measure the activities of daily living in person's with neck pain. Each section is scored on a 0-5 scale, 5 representing the greatest disability. The scores of each section are added together for a total score of 50. Score 0 1-10 11-20 21-30 31-40 41-49 50   Modifier CH CI CJ CK CL CM CN     ? Changing and Maintaining Body Position:     - CURRENT STATUS: CI - 1%-19% impaired, limited or restricted - upper limit    - GOAL STATUS: CI - 1%-19% impaired, limited or restricted - lower limit    - D/C STATUS:  ---------------To be determined---------------     Medical Necessity:   · Patient is expected to demonstrate progress in strength, range of motion, balance, coordination and functional technique to improve tolerance to lifting, carrying, pushing, and pulling as necessary to work on cars with his grandson. · Skilled intervention continues to be required due to weakness, decreased ROM, radicular symptoms, pain, postural dysfunction, and functional limitations. Reason for Services/Other Comments:  · Patient continues to require skilled intervention due to increasing complexity of exercises. Use of outcome tool(s) and clinical judgement create a POC that gives a: Clear prediction of patient's progress: LOW COMPLEXITY            TREATMENT:   (In addition to Assessment/Re-Assessment sessions the following treatments were rendered)    Pre-treatment Symptoms/Complaints:  Patient reported less pain since last session due to manual therapy. However, he experienced a tough night sleeping due to neck pain a couple nights ago. However, he does report significant improvements overall since starting physical therapy.     Pain: Initial:   Pain Intensity 1: 0  Pain Location 1: Neck  Post Session:  0/10     Therapeutic Exercise: (30 Minutes): Exercises per grid below to improve mobility, strength, balance and coordination. Required minimal visual, verbal and manual cues to promote proper body alignment, promote proper body posture, promote proper body mechanics and promote proper body breathing techniques. Progressed resistance, range, repetitions and complexity of movement as indicated. (used abbreviations: BET-back education training) Date:  2/3/2017 Date:  2-6-17 Date:  2-10-17   Activity/Exercise Parameters Parameters Parameters   Arm bike 2 forward, 2 backward  4 min forward  4 min back, level 4   Seated scapular retraction - Band rows seated 3 x 10 2x 10 Blue 2 X 10   Seated pulldowns --- --- Blue 2 x 10   Levator scapulae stretch 3 x 30 sec 2 x 30 sec 3 x 30 sec   Doorway stretch 3 x 30 sec  3 x 30 sec   Upper thoracic stretch 3 x 30 sec  3 x 30 sec   Chest stretch 3 x 30 sec 2 x 30 sec Hands clasp behind back 3 x 30 sec   Supine cervical chin tucks --- --- 5 sec x 8 with manual cuing with legs propped           Manual Therapy (    Soft Tissue Mobilization Duration  Duration: 25 Minutes): improve joint and soft tissue mobility  · Supine traction of the cervical spine  · Supine suboccipital traction with retraction of the cervical spine  · Supine suboccipital release  · Left sided stretching of upper trapezius and levator scapulae  · Deep tissue mobilization of left sided cervical paraspinals and suboccipitals in sitting. · Left inferior 1st rib mobilizations with scalenes stretching  (Used abbreviations: MET - muscle energy technique; PNF - proprioceptive neuromuscular facilitation; NMR - neuromuscular re-education; a/p - anterior to posterior; p/a - posterior to anterio; NT - not tested)    Therapeutic Modalities: for edema and pain                                                                                               HEP: As above; handouts given to patient for all exercises.       Treatment/Session Assessment:    · Response to Treatment:  Patient reported 0/10 pain at the end of session. Continue to progress strengthening and instruction of neutral spine. Patient advised to possibly obtain 'mechanics trifocals' (which have the up close sight line on the bottom and top of the lens, and the far away focus in the center) in order to reduce the amount of neck hyperextension he has to perform when working on his cars. · Compliance with Program/Exercises: compliant most of the time. · Recommendations/Intent for next treatment session: \"Next visit will focus on advancements to more challenging activities\".     Total Treatment Duration: 55 minutes  PT Patient Time In/Time Out  Time In: 0900  Time Out: [address], PT

## 2017-02-13 ENCOUNTER — HOSPITAL ENCOUNTER (OUTPATIENT)
Dept: PHYSICAL THERAPY | Age: 72
Discharge: HOME | End: 2017-02-13
Payer: MEDICARE

## 2017-02-13 PROCEDURE — 97110 THERAPEUTIC EXERCISES: CPT

## 2017-02-13 PROCEDURE — 97140 MANUAL THERAPY 1/> REGIONS: CPT

## 2017-02-13 NOTE — PROGRESS NOTES
Chad Mohs  : [facility] at [address], [place], [address] [place] [address]  [phone]   [phone]         OUTPATIENT PHYSICAL THERAPY:Daily Note 2017    ICD-10: Treatment Diagnosis 1: neck pain (M54.2)              Treatment Diagnosis 2: cervical radiculopathy (M54.12)  Precautions/Allergies:   Percocet [oxycodone-acetaminophen]   Fall Risk Score: 2 (? 5 = High Risk)  MD Orders: Evaluate and Treat MEDICAL/REFERRING DIAGNOSIS:  Cervicalgia [M54.2]   DATE OF ONSET: beginning of 2017  REFERRING PHYSICIAN: Maria Guadalupe Julian NP  RETURN PHYSICIAN APPOINTMENT: not scheduled     INITIAL ASSESSMENT:  Mr. Kimberlee Muro is a 66year old male presenting with neck pain and radicular pain into the top of his head and down to his shoulder that started in the beginning of 2017. He reported that he has been working on cars in a shop at home on a lift, which requires him to look up for long periods of time. Patient has been seen for 6 sessions of therapy from 2017 to 2/10/2017 with significant success. He reports significant improvements with working on his cars, sleeping, and daily activities. However, he still experiences intermittent spasm and pain on the left side especially when sleeping at times and looking up for long periods of times. He is eager to improve tolerance to working on his cars, which he does almost every day. He is a good candidate for continued skilled intervention with services to include manual therapy, modalities as needed, therapeutic exercises, postural re-education and activity modification. PROBLEM LIST (Impacting functional limitations):  1. Decreased Strength  2. Decreased ADL/Functional Activities  3. Decreased Transfer Abilities  4. Decreased Activity Tolerance  5. Increased Fatigue  6. Decreased Flexibility/Joint Mobility INTERVENTIONS PLANNED:  1. Cold  2. Electrical Stimulation  3. Heat  4.  Home Exercise Program (HEP)  5. Manual Therapy  6. Neuromuscular Re-education/Strengthening  7. Range of Motion (ROM)  8. Therapeutic Exercise/Strengthening  9. Ultrasound (US)   TREATMENT PLAN:  Effective Dates: 1/26/2017 TO 3/9/2017. Frequency/Duration: 2 times a week for 6 weeks  GOALS: (Goals have been discussed and agreed upon with patient.)  Short-Term Functional Goals: Time Frame: 1/26/2017 to 2/16/2017  1. Patient demonstrates independence with home exercise program without verbal cueing provided by therapist. - GOAL MET  2. Improve left arm and head radicular symptoms with performance of HEP and use of traction. - GOAL MET  3. Improve posture with decreased forward head, forward shoulders, and thoracic kyphosis in sitting and standing. - GOAL MET  Discharge Goals: Time Frame: 1/26/2017 to 3/9/2017  1. Improve pain to 3/10 at the most with working on his cars at home, looking up for house repairs, and sleeping. - ONGOING  2. Improve strength of deep cervical flexors, scapular retractors, and thoracic spinal extensors to at least 4/5 in order to improve tolerance to working on cars at home. - ONGOING  3. Improve form with lifting/carrying/pushing/pulling activities, as well as neutral spine with overhead activities. - ONGOING  4. Improve tenderness to palpation and spasm of left cervical paraspinals, suboccipitals, and scalenes in order to decrease pain with sleeping. - ONGOING  5. Improve tolerance to looking up working on cars for up to 10 minutes before the need to rest due to neck pain. - ONGOING  6. Improve Neck Disability Index score to 2/50 from 8/50. - ONGOING  Rehabilitation Potential For Stated Goals: Good  Regarding Ced Dyer's therapy, I certify that the treatment plan above will be carried out by a therapist or under their direction.   Thank you for this referral,  Marie Toussaint PT     Referring Physician Signature: Alejo Coleman NP              Date                    The information in this section was collected on 1/26/2017 (except where otherwise noted). HISTORY:   History of Present Injury/Illness (Reason for Referral):  Mr. Demond Shanks is a 66year old male presenting with neck pain and radicular pain into the top of his head and down to his shoulder that started in the beginning of January 2017. He reported that he has been working on cars in a shop at home on a lift, which requires him to look up for long periods of time. He reports that looking up, especially for long periods of time or into full extension creates his neck and radiating pain the most.  He denies weakness and tingling at this time, but reports that he has been unable to work on cars like he used to due to the pain. He is eager to improve tolerance to this activity, as well as any other activities requiring extended extension of the neck and head. Past Medical History/Comorbidities:   Mr. Demond Shanks  has a past medical history of Cancer ([facility] [address] (2006); Chronic maxillary sinusitis (8/14/2015); Diverticulitis; H/O colostomy (11/2012); and Microscopic hematuria (8/14/2015). He also has no past medical history of Abuse or Headache(784.0). Mr. Demond Shanks  has a past surgical history that includes colostomy (11/2012); hernia repair (1970s); colostomy take down (1/31/13); tonsillectomy; carpal tunnel release; appendectomy (11/2012); and other surgical (4/2014). Social History/Living Environment:    Patient lives at home with wife and reports general independence to modified independence with household chores and ADLs. Requires assistance with moderate to heavy lifting and overhead activities due to neck pain. Prior Level of Function/Work/Activity:  Independent without dysfunction. 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   Dominant Side:         RIGHT  Current Medications:       Current Outpatient Prescriptions:     tamsulosin (FLOMAX) 0.4 mg capsule, Take 0.4 mg by mouth daily., Disp: , Rfl:     finasteride (PROSCAR) 5 mg tablet, Take 5 mg by mouth nightly., Disp: , Rfl:    Date Last Reviewed:  2/13/2017   Number of Personal Factors/Comorbidities that affect the Plan of Care: 0: LOW COMPLEXITY   EXAMINATION:     Patient denies any increase of symptoms with coughing, sneezing or valsalva maneuver. Patient denies any headaches, changes in vision, dizziness, vertigo, nausea, drop attacks, black outs, tinnitus, dysphagia, dysarthria, LE symptoms or bowel/bladder dysfunction. Observation/Orthostatic Postural Assessment:  Patient sits with minimal (From moderate forward head, forward shoulders, and thoracic kyphosis). He is able to reverse this with cuing and is able to hold it for longer periods of time with and without cuing (From is unable to hold the position for long due to weakness and stiffness). He still has difficulty with neutral head positioning but is able to hold the position with verbal and manual cuing. No significant deformity is noted of the spine but patient is very rigid during conversation with cervical mobility especially to the left. Palpation:  Gross tenderness to palpation and spasm of left cervical paraspinals, scalenes, and suboccipitals. Flexibility moderately (From severely) limited of bilateral pectoralis minor and major. Left upper trapezius moderately (From severely) limited, right moderately. Left levator scapulae limited moderately (From severely), right limited moderately. Suboccipitals limited moderately (From severely) bilaterally. Vertebral-Basilar Screen: Hautant's test is negative. Cranial extension test is negative. ROM:   Cervical extension (degrees): 40° with pain into left shoulder and neck   Cervical flexion: 60°   Cervical left side bend: 25° with neck pain   Cervical right side bend: 35°   Cervical left rotation: 30°   Cervical right rotation: 50°     Strength: With upper quarter screen grossly symmetrical and WNL.  on the right is 60 lbs, left is 50 lbs. Scapular retractors 4/5 (From 3/5). Deep cervical flexors 3/5. Thoracic spinal extensors 3/5. Joint Mobility:  Moderate (From severe) limitations with traction, side glides and posterior to anterior glides not tested due to presence of radicular symptoms. Special Tests:  Ligament stress tests performed through upper cervical spine for transverse ligament including Sharp-Gordo test is negative, and Red Level-Axis shear test is negative. Red Level-Axis side flexion test for integrity of alar ligament is negative. Spurling test is positive for neck pain and radicular pain into the left shoulder. Cervical distraction is positive for symptom relief. Neurovascular testing for thoracic outlet syndrome is negative. Neurological Screen:  Myotomes: Key muscle strength testing for bilateral UE is WNL. Dermatomes: Sensation testing through bilateral upper quadrants for light touch is WNL. Reflexes: Biceps (C5), brachioradialis (C6), and triceps (C7) are 1+, 1+ and 1+. Neural tension tests: Upper limb tension test is negative. Slump test is negative. Functional Mobility:  Patient is grossly independent to modified independence with most ADLs and household chores. He requires assistance with moderate to heavy lifting when working on cars with his grandson, and especially when looking up using lift in his shop. Body Structures Involved:  1. Nerves  2. Bones  3. Joints  4. Muscles  5. Ligaments Body Functions Affected:  1. Sensory/Pain  2. Neuromusculoskeletal Activities and Participation Affected:  1. General Tasks and Demands  2. Working on and restoring cars    Number of elements (examined above) that affect the Plan of Care: 3: MODERATE COMPLEXITY   CLINICAL PRESENTATION:   Presentation: Stable and uncomplicated: LOW COMPLEXITY   CLINICAL DECISION MAKING:   Outcome Measure:    Tool Used: Neck Disability Index (NDI)  Score:  Initial: 8/50  Most Recent: 9/50 (Date: 2/10/2017)   Interpretation of Score: The Neck Disability Index is a revised form of the Oswestry Low Back Pain Index and is designed to measure the activities of daily living in person's with neck pain. Each section is scored on a 0-5 scale, 5 representing the greatest disability. The scores of each section are added together for a total score of 50. Score 0 1-10 11-20 21-30 31-40 41-49 50   Modifier CH CI CJ CK CL CM CN     ? Changing and Maintaining Body Position:     - CURRENT STATUS: CI - 1%-19% impaired, limited or restricted - upper limit    - GOAL STATUS: CI - 1%-19% impaired, limited or restricted - lower limit    - D/C STATUS:  ---------------To be determined---------------     Medical Necessity:   · Patient is expected to demonstrate progress in strength, range of motion, balance, coordination and functional technique to improve tolerance to lifting, carrying, pushing, and pulling as necessary to work on cars with his grandson. · Skilled intervention continues to be required due to weakness, decreased ROM, radicular symptoms, pain, postural dysfunction, and functional limitations. Reason for Services/Other Comments:  · Patient continues to require skilled intervention due to increasing complexity of exercises. Use of outcome tool(s) and clinical judgement create a POC that gives a: Clear prediction of patient's progress: LOW COMPLEXITY            TREATMENT:   (In addition to Assessment/Re-Assessment sessions the following treatments were rendered)    Pre-treatment Symptoms/Complaints:  Patient reported he had the best nights sleep last night and he felt great after his last therapy session. Pain: Initial:   Pain Intensity 1: 0  Pain Location 1: Neck  Post Session:  0/10     Therapeutic Exercise: (30 Minutes):  Exercises per grid below to improve mobility, strength, balance and coordination.   Required minimal visual, verbal and manual cues to promote proper body alignment, promote proper body posture, promote proper body mechanics and promote proper body breathing techniques. Progressed resistance, range, repetitions and complexity of movement as indicated. (used abbreviations: BET-back education training) Date:  2/13/2017 Date:  2-6-17 Date:  2-10-17   Activity/Exercise Parameters Parameters Parameters   Arm bike 4 forward, 4 backward, level 4  4 min forward  4 min back, level 4   Seated scapular retraction - Band rows seated Blue 3 x 10 2x 10 Blue 2 X 10   Seated pulldowns Blue 3 x 10 --- Blue 2 x 10   Levator scapulae stretch --- 2 x 30 sec 3 x 30 sec   Doorway stretch 3 x 30 sec  3 x 30 sec   Upper thoracic stretch 3 x 30 sec  3 x 30 sec   Chest stretch 3 x 30 sec 2 x 30 sec Hands clasp behind back 3 x 30 sec   Supine cervical chin tucks 5 sec x 10 --- 5 sec x 8 with manual cuing with legs propped           Manual Therapy (    Soft Tissue Mobilization Duration  Duration: 25 Minutes): improve joint and soft tissue mobility  · Supine traction of the cervical spine  · Supine suboccipital traction with retraction of the cervical spine  · Supine suboccipital release  · Left sided stretching of upper trapezius and levator scapulae  · Deep tissue mobilization of left sided cervical paraspinals and suboccipitals in sitting. · Left inferior 1st rib mobilizations with scalenes stretching  (Used abbreviations: MET - muscle energy technique; PNF - proprioceptive neuromuscular facilitation; NMR - neuromuscular re-education; a/p - anterior to posterior; p/a - posterior to anterio; NT - not tested)    Therapeutic Modalities: for edema and pain                                                                                               HEP: As above; handouts given to patient for all exercises. Treatment/Session Assessment:    · Response to Treatment:  Patient reported 0/10 pain at the end of session.   Joint mobility improving but distinct click is noted on the left intermittently with AROM of the neck. Continue to progress postural stability and joint mobility. · Compliance with Program/Exercises: compliant most of the time. · Recommendations/Intent for next treatment session: \"Next visit will focus on advancements to more challenging activities\".     Total Treatment Duration: 55 minutes  PT Patient Time In/Time Out  Time In: 9271  Time Out: 7499 [facility]

## 2017-02-17 ENCOUNTER — HOSPITAL ENCOUNTER (OUTPATIENT)
Dept: PHYSICAL THERAPY | Age: 72
Discharge: HOME | End: 2017-02-17
Payer: MEDICARE

## 2017-02-17 PROCEDURE — 97110 THERAPEUTIC EXERCISES: CPT

## 2017-02-17 PROCEDURE — 97140 MANUAL THERAPY 1/> REGIONS: CPT

## 2017-02-17 NOTE — PROGRESS NOTES
Arnaldo Flowers  :  [facility] at [address], [place], [address] [place] [address]  [phone]   [phone]         OUTPATIENT PHYSICAL THERAPY:Daily Note 2017    ICD-10: Treatment Diagnosis 1: neck pain (M54.2)              Treatment Diagnosis 2: cervical radiculopathy (M54.12)  Precautions/Allergies:   Percocet [oxycodone-acetaminophen]   Fall Risk Score: 2 (? 5 = High Risk)  MD Orders: Evaluate and Treat MEDICAL/REFERRING DIAGNOSIS:  Cervicalgia [M54.2]   DATE OF ONSET: beginning of 2017  REFERRING PHYSICIAN: Katia Almazan NP  RETURN PHYSICIAN APPOINTMENT: not scheduled     INITIAL ASSESSMENT:  Mr. Jamin Pedro is a 66year old male presenting with neck pain and radicular pain into the top of his head and down to his shoulder that started in the beginning of 2017. He reported that he has been working on cars in a shop at home on a lift, which requires him to look up for long periods of time. Patient has been seen for 6 sessions of therapy from 2017 to 2/10/2017 with significant success. He reports significant improvements with working on his cars, sleeping, and daily activities. However, he still experiences intermittent spasm and pain on the left side especially when sleeping at times and looking up for long periods of times. He is eager to improve tolerance to working on his cars, which he does almost every day. He is a good candidate for continued skilled intervention with services to include manual therapy, modalities as needed, therapeutic exercises, postural re-education and activity modification. PROBLEM LIST (Impacting functional limitations):  1. Decreased Strength  2. Decreased ADL/Functional Activities  3. Decreased Transfer Abilities  4. Decreased Activity Tolerance  5. Increased Fatigue  6. Decreased Flexibility/Joint Mobility INTERVENTIONS PLANNED:  1. Cold  2. Electrical Stimulation  3. Heat  4.  Home Exercise Program (HEP)  5. Manual Therapy  6. Neuromuscular Re-education/Strengthening  7. Range of Motion (ROM)  8. Therapeutic Exercise/Strengthening  9. Ultrasound (US)   TREATMENT PLAN:  Effective Dates: 1/26/2017 TO 3/9/2017. Frequency/Duration: 2 times a week for 6 weeks  GOALS: (Goals have been discussed and agreed upon with patient.)  Short-Term Functional Goals: Time Frame: 1/26/2017 to 2/16/2017  1. Patient demonstrates independence with home exercise program without verbal cueing provided by therapist. - GOAL MET  2. Improve left arm and head radicular symptoms with performance of HEP and use of traction. - GOAL MET  3. Improve posture with decreased forward head, forward shoulders, and thoracic kyphosis in sitting and standing. - GOAL MET  Discharge Goals: Time Frame: 1/26/2017 to 3/9/2017  1. Improve pain to 3/10 at the most with working on his cars at home, looking up for house repairs, and sleeping. - ONGOING  2. Improve strength of deep cervical flexors, scapular retractors, and thoracic spinal extensors to at least 4/5 in order to improve tolerance to working on cars at home. - ONGOING  3. Improve form with lifting/carrying/pushing/pulling activities, as well as neutral spine with overhead activities. - GOAL MET  4. Improve tenderness to palpation and spasm of left cervical paraspinals, suboccipitals, and scalenes in order to decrease pain with sleeping. - GOAL MET  5. Improve tolerance to looking up working on cars for up to 10 minutes before the need to rest due to neck pain. - ONGOING  6. Improve Neck Disability Index score to 2/50 from 8/50. - ONGOING  Rehabilitation Potential For Stated Goals: Good  Regarding Abdulkadir Dyer's therapy, I certify that the treatment plan above will be carried out by a therapist or under their direction.   Thank you for this referral,  Gonzales Toussaint PT     Referring Physician Signature: Feroz Larson NP              Date                    The information in this section was collected on 1/26/2017 (except where otherwise noted). HISTORY:   History of Present Injury/Illness (Reason for Referral):  Mr. Quan Sol is a 66year old male presenting with neck pain and radicular pain into the top of his head and down to his shoulder that started in the beginning of January 2017. He reported that he has been working on cars in a shop at home on a lift, which requires him to look up for long periods of time. He reports that looking up, especially for long periods of time or into full extension creates his neck and radiating pain the most.  He denies weakness and tingling at this time, but reports that he has been unable to work on cars like he used to due to the pain. He is eager to improve tolerance to this activity, as well as any other activities requiring extended extension of the neck and head. Past Medical History/Comorbidities:   Mr. Quan Sol  has a past medical history of Cancer ([facility] [address] (2006); Chronic maxillary sinusitis (8/14/2015); Diverticulitis; H/O colostomy (11/2012); and Microscopic hematuria (8/14/2015). He also has no past medical history of Abuse or Headache(784.0). Mr. Quan Sol  has a past surgical history that includes colostomy (11/2012); hernia repair (1970s); colostomy take down (1/31/13); tonsillectomy; carpal tunnel release; appendectomy (11/2012); and other surgical (4/2014). Social History/Living Environment:    Patient lives at home with wife and reports general independence to modified independence with household chores and ADLs. Requires assistance with moderate to heavy lifting and overhead activities due to neck pain. Prior Level of Function/Work/Activity:  Independent without dysfunction. 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   Dominant Side:         RIGHT  Current Medications:       Current Outpatient Prescriptions:     tamsulosin (FLOMAX) 0.4 mg capsule, Take 0.4 mg by mouth daily., Disp: , Rfl:     finasteride (PROSCAR) 5 mg tablet, Take 5 mg by mouth nightly., Disp: , Rfl:    Date Last Reviewed:  2/17/2017   Number of Personal Factors/Comorbidities that affect the Plan of Care: 0: LOW COMPLEXITY   EXAMINATION:     Patient denies any increase of symptoms with coughing, sneezing or valsalva maneuver. Patient denies any headaches, changes in vision, dizziness, vertigo, nausea, drop attacks, black outs, tinnitus, dysphagia, dysarthria, LE symptoms or bowel/bladder dysfunction. Observation/Orthostatic Postural Assessment:  Patient sits with minimal (From moderate forward head, forward shoulders, and thoracic kyphosis). He is able to reverse this with cuing and is able to hold it for longer periods of time with and without cuing (From is unable to hold the position for long due to weakness and stiffness). He still has difficulty with neutral head positioning but is able to hold the position with verbal and manual cuing. No significant deformity is noted of the spine but patient is very rigid during conversation with cervical mobility especially to the left. Palpation:  Gross tenderness to palpation and spasm of left cervical paraspinals, scalenes, and suboccipitals. Flexibility moderately (From severely) limited of bilateral pectoralis minor and major. Left upper trapezius moderately (From severely) limited, right moderately. Left levator scapulae limited moderately (From severely), right limited moderately. Suboccipitals limited moderately (From severely) bilaterally. Vertebral-Basilar Screen: Hautant's test is negative. Cranial extension test is negative. ROM:   Cervical extension (degrees): 40° with pain into left shoulder and neck   Cervical flexion: 60°   Cervical left side bend: 25° with neck pain   Cervical right side bend: 35°   Cervical left rotation: 30°   Cervical right rotation: 50°     Strength: With upper quarter screen grossly symmetrical and WNL.  on the right is 60 lbs, left is 50 lbs. Scapular retractors 4/5 (From 3/5). Deep cervical flexors 3/5. Thoracic spinal extensors 3/5. Joint Mobility:  Moderate (From severe) limitations with traction, side glides and posterior to anterior glides not tested due to presence of radicular symptoms. Special Tests:  Ligament stress tests performed through upper cervical spine for transverse ligament including Sharp-Gordo test is negative, and Ashburnham-Axis shear test is negative. Ashburnham-Axis side flexion test for integrity of alar ligament is negative. Spurling test is positive for neck pain and radicular pain into the left shoulder. Cervical distraction is positive for symptom relief. Neurovascular testing for thoracic outlet syndrome is negative. Neurological Screen:  Myotomes: Key muscle strength testing for bilateral UE is WNL. Dermatomes: Sensation testing through bilateral upper quadrants for light touch is WNL. Reflexes: Biceps (C5), brachioradialis (C6), and triceps (C7) are 1+, 1+ and 1+. Neural tension tests: Upper limb tension test is negative. Slump test is negative. Functional Mobility:  Patient is grossly independent to modified independence with most ADLs and household chores. He requires assistance with moderate to heavy lifting when working on cars with his grandson, and especially when looking up using lift in his shop. Body Structures Involved:  1. Nerves  2. Bones  3. Joints  4. Muscles  5. Ligaments Body Functions Affected:  1. Sensory/Pain  2. Neuromusculoskeletal Activities and Participation Affected:  1. General Tasks and Demands  2. Working on and restoring cars    Number of elements (examined above) that affect the Plan of Care: 3: MODERATE COMPLEXITY   CLINICAL PRESENTATION:   Presentation: Stable and uncomplicated: LOW COMPLEXITY   CLINICAL DECISION MAKING:   Outcome Measure:    Tool Used: Neck Disability Index (NDI)  Score:  Initial: 8/50  Most Recent: 9/50 (Date: 2/10/2017)   Interpretation of Score: The Neck Disability Index is a revised form of the Oswestry Low Back Pain Index and is designed to measure the activities of daily living in person's with neck pain. Each section is scored on a 0-5 scale, 5 representing the greatest disability. The scores of each section are added together for a total score of 50. Score 0 1-10 11-20 21-30 31-40 41-49 50   Modifier CH CI CJ CK CL CM CN     ? Changing and Maintaining Body Position:     - CURRENT STATUS: CI - 1%-19% impaired, limited or restricted - upper limit    - GOAL STATUS: CI - 1%-19% impaired, limited or restricted - lower limit    - D/C STATUS:  ---------------To be determined---------------     Medical Necessity:   · Patient is expected to demonstrate progress in strength, range of motion, balance, coordination and functional technique to improve tolerance to lifting, carrying, pushing, and pulling as necessary to work on cars with his grandson. · Skilled intervention continues to be required due to weakness, decreased ROM, radicular symptoms, pain, postural dysfunction, and functional limitations. Reason for Services/Other Comments:  · Patient continues to require skilled intervention due to increasing complexity of exercises. Use of outcome tool(s) and clinical judgement create a POC that gives a: Clear prediction of patient's progress: LOW COMPLEXITY            TREATMENT:   (In addition to Assessment/Re-Assessment sessions the following treatments were rendered)    Pre-treatment Symptoms/Complaints:  Patient reported minimal to no pain during the day, and only pain with sleeping especially on the left. Pain: Initial:   Pain Intensity 1: 0  Pain Location 1: Neck  Post Session:  0/10     Therapeutic Exercise: (30 Minutes):  Exercises per grid below to improve mobility, strength, balance and coordination.   Required minimal visual, verbal and manual cues to promote proper body alignment, promote proper body posture, promote proper body mechanics and promote proper body breathing techniques. Progressed resistance, range, repetitions and complexity of movement as indicated. (used abbreviations: BET-back education training) Date:  2/13/2017 Date:  2-17-17 Date:  2-10-17   Activity/Exercise Parameters Parameters Parameters   Arm bike 4 forward, 4 backward, level 4 4 forward, 4 backward level 6 4 min forward  4 min back, level 4   Seated scapular retraction - Band rows seated Blue 3 x 10 Blue 3 x 10 Blue 2 X 10   Seated pulldowns Blue 3 x 10 Blue 3 x 10 Blue 2 x 10   Levator scapulae stretch --- 2 x 30 sec 3 x 30 sec   Doorway stretch 3 x 30 sec 3 x 30 sec 3 x 30 sec   Upper thoracic stretch 3 x 30 sec 3 x 30 sec 3 x 30 sec   Chest stretch 3 x 30 sec 2 x 30 sec Hands clasp behind back 3 x 30 sec   Supine cervical chin tucks 5 sec x 10 5 sec x 10 seated 5 sec x 8 with manual cuing with legs propped           Manual Therapy (    Soft Tissue Mobilization Duration  Duration: 25 Minutes): improve joint and soft tissue mobility  · Supine traction of the cervical spine  · Supine suboccipital traction with retraction of the cervical spine  · Supine suboccipital release  · Left sided stretching of upper trapezius and levator scapulae  · Deep tissue mobilization of left sided cervical paraspinals and suboccipitals in sitting.    · Left inferior 1st rib mobilizations with scalenes stretching  · Left side glides of right cervical spine Grade III-IV  (Used abbreviations: MET - muscle energy technique; PNF - proprioceptive neuromuscular facilitation; NMR - neuromuscular re-education; a/p - anterior to posterior; p/a - posterior to anterio; NT - not tested)    Therapeutic Modalities: for edema and pain               Cervical Spine Heat  Type: Moist pack  Duration: 10 minutes  Patient Position: Sitting                                                                               HEP: As above; handouts given to patient for all exercises. Treatment/Session Assessment:    · Response to Treatment:  Patient reported 0/10 pain at the end of session. Tolerated seated chin tucks today. Mobility improving of cervical spine especially on the left. Continue to progress core/postural/cervical strengthening. · Compliance with Program/Exercises: compliant most of the time. · Recommendations/Intent for next treatment session: \"Next visit will focus on advancements to more challenging activities\".     Total Treatment Duration: 55 minutes  PT Patient Time In/Time Out  Time In: 0900  Time Out: [address] [place], [place]

## 2017-02-20 ENCOUNTER — HOSPITAL ENCOUNTER (OUTPATIENT)
Dept: PHYSICAL THERAPY | Age: 72
Discharge: HOME | End: 2017-02-20
Payer: MEDICARE

## 2017-02-20 PROCEDURE — 97140 MANUAL THERAPY 1/> REGIONS: CPT

## 2017-02-20 PROCEDURE — 97110 THERAPEUTIC EXERCISES: CPT

## 2017-02-20 NOTE — PROGRESS NOTES
Lui Ibarra  :  [facility] at [address], [place], [address] [place] [address]  [phone]   [phone]         OUTPATIENT PHYSICAL THERAPY:Daily Note 2017    ICD-10: Treatment Diagnosis 1: neck pain (M54.2)              Treatment Diagnosis 2: cervical radiculopathy (M54.12)  Precautions/Allergies:   Percocet [oxycodone-acetaminophen]   Fall Risk Score: 2 (? 5 = High Risk)  MD Orders: Evaluate and Treat MEDICAL/REFERRING DIAGNOSIS:  Cervicalgia [M54.2]   DATE OF ONSET: beginning of 2017  REFERRING PHYSICIAN: Feroz Larson NP  RETURN PHYSICIAN APPOINTMENT: not scheduled     INITIAL ASSESSMENT:  Mr. April Keller is a 66year old male presenting with neck pain and radicular pain into the top of his head and down to his shoulder that started in the beginning of 2017. He reported that he has been working on cars in a shop at home on a lift, which requires him to look up for long periods of time. Patient has been seen for 6 sessions of therapy from 2017 to 2/10/2017 with significant success. He reports significant improvements with working on his cars, sleeping, and daily activities. However, he still experiences intermittent spasm and pain on the left side especially when sleeping at times and looking up for long periods of times. He is eager to improve tolerance to working on his cars, which he does almost every day. He is a good candidate for continued skilled intervention with services to include manual therapy, modalities as needed, therapeutic exercises, postural re-education and activity modification. PROBLEM LIST (Impacting functional limitations):  1. Decreased Strength  2. Decreased ADL/Functional Activities  3. Decreased Transfer Abilities  4. Decreased Activity Tolerance  5. Increased Fatigue  6. Decreased Flexibility/Joint Mobility INTERVENTIONS PLANNED:  1. Cold  2. Electrical Stimulation  3. Heat  4.  Home Exercise Program (HEP)  5. Manual Therapy  6. Neuromuscular Re-education/Strengthening  7. Range of Motion (ROM)  8. Therapeutic Exercise/Strengthening  9. Ultrasound (US)   TREATMENT PLAN:  Effective Dates: 1/26/2017 TO 3/9/2017. Frequency/Duration: 2 times a week for 6 weeks  GOALS: (Goals have been discussed and agreed upon with patient.)  Short-Term Functional Goals: Time Frame: 1/26/2017 to 2/16/2017  1. Patient demonstrates independence with home exercise program without verbal cueing provided by therapist. - GOAL MET  2. Improve left arm and head radicular symptoms with performance of HEP and use of traction. - GOAL MET  3. Improve posture with decreased forward head, forward shoulders, and thoracic kyphosis in sitting and standing. - GOAL MET  Discharge Goals: Time Frame: 1/26/2017 to 3/9/2017  1. Improve pain to 3/10 at the most with working on his cars at home, looking up for house repairs, and sleeping. - ONGOING  2. Improve strength of deep cervical flexors, scapular retractors, and thoracic spinal extensors to at least 4/5 in order to improve tolerance to working on cars at home. - ONGOING  3. Improve form with lifting/carrying/pushing/pulling activities, as well as neutral spine with overhead activities. - GOAL MET  4. Improve tenderness to palpation and spasm of left cervical paraspinals, suboccipitals, and scalenes in order to decrease pain with sleeping. - GOAL MET  5. Improve tolerance to looking up working on cars for up to 10 minutes before the need to rest due to neck pain. - ONGOING  6. Improve Neck Disability Index score to 2/50 from 8/50. - ONGOING  Rehabilitation Potential For Stated Goals: Good  Regarding Polymark Armentakyra Dyer's therapy, I certify that the treatment plan above will be carried out by a therapist or under their direction.   Thank you for this referral,  Luisa Lemus PT     Referring Physician Signature: Christin Ruff NP              Date                    The information in this section was collected on 1/26/2017 (except where otherwise noted). HISTORY:   History of Present Injury/Illness (Reason for Referral):  Mr. Demond Shanks is a 66year old male presenting with neck pain and radicular pain into the top of his head and down to his shoulder that started in the beginning of January 2017. He reported that he has been working on cars in a shop at home on a lift, which requires him to look up for long periods of time. He reports that looking up, especially for long periods of time or into full extension creates his neck and radiating pain the most.  He denies weakness and tingling at this time, but reports that he has been unable to work on cars like he used to due to the pain. He is eager to improve tolerance to this activity, as well as any other activities requiring extended extension of the neck and head. Past Medical History/Comorbidities:   Mr. Demond Shanks  has a past medical history of Cancer ([facility] [address] (2006); Chronic maxillary sinusitis (8/14/2015); Diverticulitis; H/O colostomy (11/2012); and Microscopic hematuria (8/14/2015). He also has no past medical history of Abuse or Headache(784.0). Mr. Demond Shanks  has a past surgical history that includes colostomy (11/2012); hernia repair (1970s); colostomy take down (1/31/13); tonsillectomy; carpal tunnel release; appendectomy (11/2012); and other surgical (4/2014). Social History/Living Environment:    Patient lives at home with wife and reports general independence to modified independence with household chores and ADLs. Requires assistance with moderate to heavy lifting and overhead activities due to neck pain. Prior Level of Function/Work/Activity:  Independent without dysfunction. 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   Dominant Side:         RIGHT  Current Medications:       Current Outpatient Prescriptions:     tamsulosin (FLOMAX) 0.4 mg capsule, Take 0.4 mg by mouth daily., Disp: , Rfl:     finasteride (PROSCAR) 5 mg tablet, Take 5 mg by mouth nightly., Disp: , Rfl:    Date Last Reviewed:  2/20/2017   Number of Personal Factors/Comorbidities that affect the Plan of Care: 0: LOW COMPLEXITY   EXAMINATION:     Patient denies any increase of symptoms with coughing, sneezing or valsalva maneuver. Patient denies any headaches, changes in vision, dizziness, vertigo, nausea, drop attacks, black outs, tinnitus, dysphagia, dysarthria, LE symptoms or bowel/bladder dysfunction. Observation/Orthostatic Postural Assessment:  Patient sits with minimal (From moderate forward head, forward shoulders, and thoracic kyphosis). He is able to reverse this with cuing and is able to hold it for longer periods of time with and without cuing (From is unable to hold the position for long due to weakness and stiffness). He still has difficulty with neutral head positioning but is able to hold the position with verbal and manual cuing. No significant deformity is noted of the spine but patient is very rigid during conversation with cervical mobility especially to the left. Palpation:  Gross tenderness to palpation and spasm of left cervical paraspinals, scalenes, and suboccipitals. Flexibility moderately (From severely) limited of bilateral pectoralis minor and major. Left upper trapezius moderately (From severely) limited, right moderately. Left levator scapulae limited moderately (From severely), right limited moderately. Suboccipitals limited moderately (From severely) bilaterally. Vertebral-Basilar Screen: Hautant's test is negative. Cranial extension test is negative. ROM:   Cervical extension (degrees): 40° with pain into left shoulder and neck   Cervical flexion: 60°   Cervical left side bend: 25° with neck pain   Cervical right side bend: 35°   Cervical left rotation: 30°   Cervical right rotation: 50°     Strength: With upper quarter screen grossly symmetrical and WNL.  on the right is 60 lbs, left is 50 lbs. Scapular retractors 4/5 (From 3/5). Deep cervical flexors 3/5. Thoracic spinal extensors 3/5. Joint Mobility:  Moderate (From severe) limitations with traction, side glides and posterior to anterior glides not tested due to presence of radicular symptoms. Special Tests:  Ligament stress tests performed through upper cervical spine for transverse ligament including Sharp-Gordo test is negative, and [place]-Axis shear test is negative. [place]-Axis side flexion test for integrity of alar ligament is negative. Spurling test is positive for neck pain and radicular pain into the left shoulder. Cervical distraction is positive for symptom relief. Neurovascular testing for thoracic outlet syndrome is negative. Neurological Screen:  Myotomes: Key muscle strength testing for bilateral UE is WNL. Dermatomes: Sensation testing through bilateral upper quadrants for light touch is WNL. Reflexes: Biceps (C5), brachioradialis (C6), and triceps (C7) are 1+, 1+ and 1+. Neural tension tests: Upper limb tension test is negative. Slump test is negative. Functional Mobility:  Patient is grossly independent to modified independence with most ADLs and household chores. He requires assistance with moderate to heavy lifting when working on cars with his grandson, and especially when looking up using lift in his shop. Body Structures Involved:  1. Nerves  2. Bones  3. Joints  4. Muscles  5. Ligaments Body Functions Affected:  1. Sensory/Pain  2. Neuromusculoskeletal Activities and Participation Affected:  1. General Tasks and Demands  2. Working on and restoring cars    Number of elements (examined above) that affect the Plan of Care: 3: MODERATE COMPLEXITY   CLINICAL PRESENTATION:   Presentation: Stable and uncomplicated: LOW COMPLEXITY   CLINICAL DECISION MAKING:   Outcome Measure:    Tool Used: Neck Disability Index (NDI)  Score:  Initial: 8/50  Most Recent: 9/50 (Date: 2/10/2017)   Interpretation of Score: The Neck Disability Index is a revised form of the Oswestry Low Back Pain Index and is designed to measure the activities of daily living in person's with neck pain. Each section is scored on a 0-5 scale, 5 representing the greatest disability. The scores of each section are added together for a total score of 50. Score 0 1-10 11-20 21-30 31-40 41-49 50   Modifier CH CI CJ CK CL CM CN     ? Changing and Maintaining Body Position:     - CURRENT STATUS: CI - 1%-19% impaired, limited or restricted - upper limit    - GOAL STATUS: CI - 1%-19% impaired, limited or restricted - lower limit    - D/C STATUS:  ---------------To be determined---------------     Medical Necessity:   · Patient is expected to demonstrate progress in strength, range of motion, balance, coordination and functional technique to improve tolerance to lifting, carrying, pushing, and pulling as necessary to work on cars with his grandson. · Skilled intervention continues to be required due to weakness, decreased ROM, radicular symptoms, pain, postural dysfunction, and functional limitations. Reason for Services/Other Comments:  · Patient continues to require skilled intervention due to increasing complexity of exercises. Use of outcome tool(s) and clinical judgement create a POC that gives a: Clear prediction of patient's progress: LOW COMPLEXITY            TREATMENT:   (In addition to Assessment/Re-Assessment sessions the following treatments were rendered)    Pre-treatment Symptoms/Complaints:  Patient reported less pain overall but still struggling with sleep. Took an aleve last night and it helped with ability to stay asleep. Pain: Initial:   Pain Intensity 1: 0  Pain Location 1: Neck  Post Session:  0/10     Therapeutic Exercise: (30 Minutes):  Exercises per grid below to improve mobility, strength, balance and coordination.   Required minimal visual, verbal and manual cues to promote proper body alignment, promote proper body posture, promote proper body mechanics and promote proper body breathing techniques. Progressed resistance, range, repetitions and complexity of movement as indicated. (used abbreviations: BET-back education training) Date:  2/13/2017 Date:  2-17-17 Date:  2-20-17   Activity/Exercise Parameters Parameters Parameters   Arm bike 4 forward, 4 backward, level 4 4 forward, 4 backward level 6 4 min forward  6 min back, level 4   Seated scapular retraction - Band rows seated Blue 3 x 10 Blue 3 x 10 Blue 3 X 10   Seated pulldowns Blue 3 x 10 Blue 3 x 10 Blue 3 x 10   Levator scapulae stretch --- 2 x 30 sec ---   Doorway stretch 3 x 30 sec 3 x 30 sec 3 x 30 sec   Upper thoracic stretch 3 x 30 sec 3 x 30 sec 3 x 30 sec   Chest stretch 3 x 30 sec 2 x 30 sec Hands clasp behind back 3 x 30 sec   Supine cervical chin tucks 5 sec x 10 5 sec x 10 seated 5 sec x 8 with manual cuing with legs propped           Manual Therapy (    Soft Tissue Mobilization Duration  Duration: 25 Minutes): improve joint and soft tissue mobility  · Supine traction of the cervical spine  · Supine suboccipital traction with retraction of the cervical spine  · Supine suboccipital release  · Left sided stretching of upper trapezius and levator scapulae  · Deep tissue mobilization of left sided cervical paraspinals and suboccipitals in sitting. · Left inferior 1st rib mobilizations with scalenes stretching  · Left side glides of right cervical spine Grade III-IV  (Used abbreviations: MET - muscle energy technique; PNF - proprioceptive neuromuscular facilitation; NMR - neuromuscular re-education; a/p - anterior to posterior; p/a - posterior to anterio; NT - not tested)    Therapeutic Modalities: for edema and pain                                                                                               HEP: As above; handouts given to patient for all exercises.       Treatment/Session Assessment:    · Response to Treatment:  Patient reported 0/10 pain at the end of session. Mobility of cervical spine and soft tissue tension improved overall. Posture improving. Will plan to transition to HEP. · Compliance with Program/Exercises: compliant most of the time. · Recommendations/Intent for next treatment session: \"Next visit will focus on advancements to more challenging activities\".     Total Treatment Duration: 55 minutes  PT Patient Time In/Time Out  Time In: 1329  Time Out: [facility]

## 2017-02-24 ENCOUNTER — HOSPITAL ENCOUNTER (OUTPATIENT)
Dept: PHYSICAL THERAPY | Age: 72
Discharge: HOME | End: 2017-02-24
Payer: MEDICARE

## 2017-02-24 PROCEDURE — 97140 MANUAL THERAPY 1/> REGIONS: CPT

## 2017-02-24 PROCEDURE — 97110 THERAPEUTIC EXERCISES: CPT

## 2017-02-24 NOTE — PROGRESS NOTES
Fiordaliza Multani  :  [facility] at [address], [place], [address] [place] [address]  [phone]   [phone]         OUTPATIENT PHYSICAL THERAPY:Daily Note 2017    ICD-10: Treatment Diagnosis 1: neck pain (M54.2)              Treatment Diagnosis 2: cervical radiculopathy (M54.12)  Precautions/Allergies:   Percocet [oxycodone-acetaminophen]   Fall Risk Score: 2 (? 5 = High Risk)  MD Orders: Evaluate and Treat MEDICAL/REFERRING DIAGNOSIS:  Cervicalgia [M54.2]   DATE OF ONSET: beginning of 2017  REFERRING PHYSICIAN: Alejo Coleman NP  RETURN PHYSICIAN APPOINTMENT: not scheduled     INITIAL ASSESSMENT:  Mr. Carl Duron is a 66year old male presenting with neck pain and radicular pain into the top of his head and down to his shoulder that started in the beginning of 2017. He reported that he has been working on cars in a shop at home on a lift, which requires him to look up for long periods of time. Patient has been seen for 6 sessions of therapy from 2017 to 2/10/2017 with significant success. He reports significant improvements with working on his cars, sleeping, and daily activities. However, he still experiences intermittent spasm and pain on the left side especially when sleeping at times and looking up for long periods of times. He is eager to improve tolerance to working on his cars, which he does almost every day. He is a good candidate for continued skilled intervention with services to include manual therapy, modalities as needed, therapeutic exercises, postural re-education and activity modification. PROBLEM LIST (Impacting functional limitations):  1. Decreased Strength  2. Decreased ADL/Functional Activities  3. Decreased Transfer Abilities  4. Decreased Activity Tolerance  5. Increased Fatigue  6. Decreased Flexibility/Joint Mobility INTERVENTIONS PLANNED:  1. Cold  2. Electrical Stimulation  3. Heat  4.  Home Exercise Program (HEP)  5. Manual Therapy  6. Neuromuscular Re-education/Strengthening  7. Range of Motion (ROM)  8. Therapeutic Exercise/Strengthening  9. Ultrasound (US)   TREATMENT PLAN:  Effective Dates: 1/26/2017 TO 3/9/2017. Frequency/Duration: 2 times a week for 6 weeks  GOALS: (Goals have been discussed and agreed upon with patient.)  Short-Term Functional Goals: Time Frame: 1/26/2017 to 2/16/2017  1. Patient demonstrates independence with home exercise program without verbal cueing provided by therapist. - GOAL MET  2. Improve left arm and head radicular symptoms with performance of HEP and use of traction. - GOAL MET  3. Improve posture with decreased forward head, forward shoulders, and thoracic kyphosis in sitting and standing. - GOAL MET  Discharge Goals: Time Frame: 1/26/2017 to 3/9/2017  1. Improve pain to 3/10 at the most with working on his cars at home, looking up for house repairs, and sleeping. - ONGOING  2. Improve strength of deep cervical flexors, scapular retractors, and thoracic spinal extensors to at least 4/5 in order to improve tolerance to working on cars at home. - ONGOING  3. Improve form with lifting/carrying/pushing/pulling activities, as well as neutral spine with overhead activities. - GOAL MET  4. Improve tenderness to palpation and spasm of left cervical paraspinals, suboccipitals, and scalenes in order to decrease pain with sleeping. - GOAL MET  5. Improve tolerance to looking up working on cars for up to 10 minutes before the need to rest due to neck pain. - ONGOING  6. Improve Neck Disability Index score to 2/50 from 8/50. - ONGOING  Rehabilitation Potential For Stated Goals: Good  Regarding Lloyd Dyer's therapy, I certify that the treatment plan above will be carried out by a therapist or under their direction.   Thank you for this referral,  Suha Driver PTA     Referring Physician Signature: Cardell Spurling, NP              Date                    The information in this section was collected on 1/26/2017 (except where otherwise noted). HISTORY:   History of Present Injury/Illness (Reason for Referral):  Mr. Baltazar Carlos is a 66year old male presenting with neck pain and radicular pain into the top of his head and down to his shoulder that started in the beginning of January 2017. He reported that he has been working on cars in a shop at home on a lift, which requires him to look up for long periods of time. He reports that looking up, especially for long periods of time or into full extension creates his neck and radiating pain the most.  He denies weakness and tingling at this time, but reports that he has been unable to work on cars like he used to due to the pain. He is eager to improve tolerance to this activity, as well as any other activities requiring extended extension of the neck and head. Past Medical History/Comorbidities:   Mr. Baltazar Carlos  has a past medical history of Cancer ([facility] [address] (2006); Chronic maxillary sinusitis (8/14/2015); Diverticulitis; H/O colostomy (11/2012); and Microscopic hematuria (8/14/2015). He also has no past medical history of Abuse or Headache(784.0). Mr. Baltazar Carlos  has a past surgical history that includes colostomy (11/2012); hernia repair (1970s); colostomy take down (1/31/13); tonsillectomy; carpal tunnel release; appendectomy (11/2012); and other surgical (4/2014). Social History/Living Environment:    Patient lives at home with wife and reports general independence to modified independence with household chores and ADLs. Requires assistance with moderate to heavy lifting and overhead activities due to neck pain. Prior Level of Function/Work/Activity:  Independent without dysfunction. 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   Dominant Side:         RIGHT  Current Medications:       Current Outpatient Prescriptions:     tamsulosin (FLOMAX) 0.4 mg capsule, Take 0.4 mg by mouth daily., Disp: , Rfl:     finasteride (PROSCAR) 5 mg tablet, Take 5 mg by mouth nightly., Disp: , Rfl:    Date Last Reviewed:  2/24/2017   Number of Personal Factors/Comorbidities that affect the Plan of Care: 0: LOW COMPLEXITY   EXAMINATION:     Patient denies any increase of symptoms with coughing, sneezing or valsalva maneuver. Patient denies any headaches, changes in vision, dizziness, vertigo, nausea, drop attacks, black outs, tinnitus, dysphagia, dysarthria, LE symptoms or bowel/bladder dysfunction. Observation/Orthostatic Postural Assessment:  Patient sits with minimal (From moderate forward head, forward shoulders, and thoracic kyphosis). He is able to reverse this with cuing and is able to hold it for longer periods of time with and without cuing (From is unable to hold the position for long due to weakness and stiffness). He still has difficulty with neutral head positioning but is able to hold the position with verbal and manual cuing. No significant deformity is noted of the spine but patient is very rigid during conversation with cervical mobility especially to the left. Palpation:  Gross tenderness to palpation and spasm of left cervical paraspinals, scalenes, and suboccipitals. Flexibility moderately (From severely) limited of bilateral pectoralis minor and major. Left upper trapezius moderately (From severely) limited, right moderately. Left levator scapulae limited moderately (From severely), right limited moderately. Suboccipitals limited moderately (From severely) bilaterally. Vertebral-Basilar Screen: Hautant's test is negative. Cranial extension test is negative. ROM:   Cervical extension (degrees): 40° with pain into left shoulder and neck   Cervical flexion: 60°   Cervical left side bend: 25° with neck pain   Cervical right side bend: 35°   Cervical left rotation: 30°   Cervical right rotation: 50°     Strength: With upper quarter screen grossly symmetrical and WNL.  on the right is 60 lbs, left is 50 lbs. Scapular retractors 4/5 (From 3/5). Deep cervical flexors 3/5. Thoracic spinal extensors 3/5. Joint Mobility:  Moderate (From severe) limitations with traction, side glides and posterior to anterior glides not tested due to presence of radicular symptoms. Special Tests:  Ligament stress tests performed through upper cervical spine for transverse ligament including Sharp-Gordo test is negative, and Denver-Axis shear test is negative. Denver-Axis side flexion test for integrity of alar ligament is negative. Spurling test is positive for neck pain and radicular pain into the left shoulder. Cervical distraction is positive for symptom relief. Neurovascular testing for thoracic outlet syndrome is negative. Neurological Screen:  Myotomes: Key muscle strength testing for bilateral UE is WNL. Dermatomes: Sensation testing through bilateral upper quadrants for light touch is WNL. Reflexes: Biceps (C5), brachioradialis (C6), and triceps (C7) are 1+, 1+ and 1+. Neural tension tests: Upper limb tension test is negative. Slump test is negative. Functional Mobility:  Patient is grossly independent to modified independence with most ADLs and household chores. He requires assistance with moderate to heavy lifting when working on cars with his grandson, and especially when looking up using lift in his shop. Body Structures Involved:  1. Nerves  2. Bones  3. Joints  4. Muscles  5. Ligaments Body Functions Affected:  1. Sensory/Pain  2. Neuromusculoskeletal Activities and Participation Affected:  1. General Tasks and Demands  2. Working on and restoring cars    Number of elements (examined above) that affect the Plan of Care: 3: MODERATE COMPLEXITY   CLINICAL PRESENTATION:   Presentation: Stable and uncomplicated: LOW COMPLEXITY   CLINICAL DECISION MAKING:   Outcome Measure:    Tool Used: Neck Disability Index (NDI)  Score:  Initial: 8/50  Most Recent: 9/50 (Date: 2/10/2017)   Interpretation of Score: The Neck Disability Index is a revised form of the Oswestry Low Back Pain Index and is designed to measure the activities of daily living in person's with neck pain. Each section is scored on a 0-5 scale, 5 representing the greatest disability. The scores of each section are added together for a total score of 50. Score 0 1-10 11-20 21-30 31-40 41-49 50   Modifier CH CI CJ CK CL CM CN     ? Changing and Maintaining Body Position:     - CURRENT STATUS: CI - 1%-19% impaired, limited or restricted - upper limit    - GOAL STATUS: CI - 1%-19% impaired, limited or restricted - lower limit    - D/C STATUS:  ---------------To be determined---------------     Medical Necessity:   · Patient is expected to demonstrate progress in strength, range of motion, balance, coordination and functional technique to improve tolerance to lifting, carrying, pushing, and pulling as necessary to work on cars with his grandson. · Skilled intervention continues to be required due to weakness, decreased ROM, radicular symptoms, pain, postural dysfunction, and functional limitations. Reason for Services/Other Comments:  · Patient continues to require skilled intervention due to increasing complexity of exercises. Use of outcome tool(s) and clinical judgement create a POC that gives a: Clear prediction of patient's progress: LOW COMPLEXITY            TREATMENT:   (In addition to Assessment/Re-Assessment sessions the following treatments were rendered)    Pre-treatment Symptoms/Complaints:  Patient reports increased neck pain today . States he felt good yesterday but sleeping continues to increase pain. Pain: Initial:   Pain Intensity 1: 2  Pain Location 1: Neck  Pain Orientation 1: Left  Post Session:  0/10     Therapeutic Exercise: (30 Minutes):  Exercises per grid below to improve mobility, strength, balance and coordination.   Required minimal visual, verbal and manual cues to promote proper body alignment, promote proper body posture, promote proper body mechanics and promote proper body breathing techniques. Progressed resistance, range, repetitions and complexity of movement as indicated. (used abbreviations: BET-back education training) Date:  2/24/2017 Date:  2-17-17 Date:  2-20-17   Activity/Exercise Parameters Parameters Parameters   Arm bike 4 forward, 4 backward, level 4 4 forward, 4 backward level 6 4 min forward  6 min back, level 4   Seated scapular retraction - Band rows seated Blue 3 x 10 Blue 3 x 10 Blue 3 X 10   Seated pulldowns Blue 3 x 10 Blue 3 x 10 Blue 3 x 10   Levator scapulae stretch --- 2 x 30 sec ---   Doorway stretch 3 x 30 sec 3 x 30 sec 3 x 30 sec   Upper thoracic stretch 3 x 30 sec 3 x 30 sec 3 x 30 sec   Chest stretch 3 x 30 sec 2 x 30 sec Hands clasp behind back 3 x 30 sec   Supine cervical chin tucks 5 sec x 10 5 sec x 10 seated 5 sec x 8 with manual cuing with legs propped           Manual Therapy (    Soft Tissue Mobilization Duration  Duration: 25 Minutes): improve joint and soft tissue mobility  · Supine traction of the cervical spine  · Supine suboccipital traction with retraction of the cervical spine  · Supine suboccipital release  · Left sided stretching of upper trapezius and levator scapulae  · Deep tissue mobilization of left sided cervical paraspinals and suboccipitals in sitting. · Left inferior 1st rib mobilizations with scalenes stretching  ·   (Used abbreviations: MET - muscle energy technique; PNF - proprioceptive neuromuscular facilitation; NMR - neuromuscular re-education; a/p - anterior to posterior; p/a - posterior to anterio; NT - not tested)    Therapeutic Modalities: for edema and pain                                                                                               HEP: As above; handouts given to patient for all exercises.       Treatment/Session Assessment:    · Response to Treatment:  Patient reported 0/10 pain at the end of session. .    · Compliance with Program/Exercises: compliant most of the time. · Recommendations/Intent for next treatment session: \"Next visit will focus on advancements to more challenging activities\".     Total Treatment Duration: 55 minutes  PT Patient Time In/Time Out  Time In: 1000  Time Out: 148 Tavo Angel PTA

## 2017-02-27 ENCOUNTER — HOSPITAL ENCOUNTER (OUTPATIENT)
Dept: PHYSICAL THERAPY | Age: 72
Discharge: HOME | End: 2017-02-27
Payer: MEDICARE

## 2017-02-27 PROCEDURE — 97140 MANUAL THERAPY 1/> REGIONS: CPT

## 2017-02-27 PROCEDURE — 97110 THERAPEUTIC EXERCISES: CPT

## 2017-02-27 NOTE — PROGRESS NOTES
Kevin Cao  :  [facility] at [address], [address]  [phone]   [phone]         OUTPATIENT PHYSICAL THERAPY:Daily Note 2017    ICD-10: Treatment Diagnosis 1: neck pain (M54.2)              Treatment Diagnosis 2: cervical radiculopathy (M54.12)  Precautions/Allergies:   Percocet [oxycodone-acetaminophen]   Fall Risk Score: 2 (? 5 = High Risk)  MD Orders: Evaluate and Treat MEDICAL/REFERRING DIAGNOSIS:  Cervicalgia [M54.2]   DATE OF ONSET: beginning of 2017  REFERRING PHYSICIAN: Lis Gates, NP  RETURN PHYSICIAN APPOINTMENT: not scheduled     INITIAL ASSESSMENT:  Mr. Riky Espinosa is a 66year old male presenting with neck pain and radicular pain into the top of his head and down to his shoulder that started in the beginning of 2017. He reported that he has been working on cars in a shop at home on a lift, which requires him to look up for long periods of time. Patient has been seen for 6 sessions of therapy from 2017 to 2/10/2017 with significant success. He reports significant improvements with working on his cars, sleeping, and daily activities. However, he still experiences intermittent spasm and pain on the left side especially when sleeping at times and looking up for long periods of times. He is eager to improve tolerance to working on his cars, which he does almost every day. He is a good candidate for continued skilled intervention with services to include manual therapy, modalities as needed, therapeutic exercises, postural re-education and activity modification. PROBLEM LIST (Impacting functional limitations):  1. Decreased Strength  2. Decreased ADL/Functional Activities  3. Decreased Transfer Abilities  4. Decreased Activity Tolerance  5. Increased Fatigue  6. Decreased Flexibility/Joint Mobility INTERVENTIONS PLANNED:  1. Cold  2. Electrical Stimulation  3. Heat  4.  Home Exercise Program (HEP)  5. Manual Therapy  6. Neuromuscular Re-education/Strengthening  7. Range of Motion (ROM)  8. Therapeutic Exercise/Strengthening  9. Ultrasound (US)   TREATMENT PLAN:  Effective Dates: 1/26/2017 TO 3/9/2017. Frequency/Duration: 2 times a week for 6 weeks  GOALS: (Goals have been discussed and agreed upon with patient.)  Short-Term Functional Goals: Time Frame: 1/26/2017 to 2/16/2017  1. Patient demonstrates independence with home exercise program without verbal cueing provided by therapist. - GOAL MET  2. Improve left arm and head radicular symptoms with performance of HEP and use of traction. - GOAL MET  3. Improve posture with decreased forward head, forward shoulders, and thoracic kyphosis in sitting and standing. - GOAL MET  Discharge Goals: Time Frame: 1/26/2017 to 3/9/2017  1. Improve pain to 3/10 at the most with working on his cars at home, looking up for house repairs, and sleeping. - ONGOING  2. Improve strength of deep cervical flexors, scapular retractors, and thoracic spinal extensors to at least 4/5 in order to improve tolerance to working on cars at home. - ONGOING  3. Improve form with lifting/carrying/pushing/pulling activities, as well as neutral spine with overhead activities. - GOAL MET  4. Improve tenderness to palpation and spasm of left cervical paraspinals, suboccipitals, and scalenes in order to decrease pain with sleeping. - GOAL MET  5. Improve tolerance to looking up working on cars for up to 10 minutes before the need to rest due to neck pain. - ONGOING  6. Improve Neck Disability Index score to 2/50 from 8/50. - ONGOING  Rehabilitation Potential For Stated Goals: Good  Regarding Dneise Sultanacombe's therapy, I certify that the treatment plan above will be carried out by a therapist or under their direction.   Thank you for this referral,  Yony Westfall PT     Referring Physician Signature: Ignacio Lewis NP              Date                    The information in this section was collected on 1/26/2017 (except where otherwise noted). HISTORY:   History of Present Injury/Illness (Reason for Referral):  Mr. Arturo Siegel is a 66year old male presenting with neck pain and radicular pain into the top of his head and down to his shoulder that started in the beginning of January 2017. He reported that he has been working on cars in a shop at home on a lift, which requires him to look up for long periods of time. He reports that looking up, especially for long periods of time or into full extension creates his neck and radiating pain the most.  He denies weakness and tingling at this time, but reports that he has been unable to work on cars like he used to due to the pain. He is eager to improve tolerance to this activity, as well as any other activities requiring extended extension of the neck and head. Past Medical History/Comorbidities:   Mr. Arturo Siegel  has a past medical history of Cancer ([facility] [address] (2006); Chronic maxillary sinusitis (8/14/2015); Diverticulitis; H/O colostomy (11/2012); and Microscopic hematuria (8/14/2015). He also has no past medical history of Abuse or Headache(784.0). Mr. Arturo Siegel  has a past surgical history that includes colostomy (11/2012); hernia repair (1970s); colostomy take down (1/31/13); tonsillectomy; carpal tunnel release; appendectomy (11/2012); and other surgical (4/2014). Social History/Living Environment:    Patient lives at home with wife and reports general independence to modified independence with household chores and ADLs. Requires assistance with moderate to heavy lifting and overhead activities due to neck pain. Prior Level of Function/Work/Activity:  Independent without dysfunction. 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   Dominant Side:         RIGHT  Current Medications:       Current Outpatient Prescriptions:     tamsulosin (FLOMAX) 0.4 mg capsule, Take 0.4 mg by mouth daily., Disp: , Rfl:     finasteride (PROSCAR) 5 mg tablet, Take 5 mg by mouth nightly., Disp: , Rfl:    Date Last Reviewed:  2/27/2017   Number of Personal Factors/Comorbidities that affect the Plan of Care: 0: LOW COMPLEXITY   EXAMINATION:     Patient denies any increase of symptoms with coughing, sneezing or valsalva maneuver. Patient denies any headaches, changes in vision, dizziness, vertigo, nausea, drop attacks, black outs, tinnitus, dysphagia, dysarthria, LE symptoms or bowel/bladder dysfunction. Observation/Orthostatic Postural Assessment:  Patient sits with minimal (From moderate forward head, forward shoulders, and thoracic kyphosis). He is able to reverse this with cuing and is able to hold it for longer periods of time with and without cuing (From is unable to hold the position for long due to weakness and stiffness). He still has difficulty with neutral head positioning but is able to hold the position with verbal and manual cuing. No significant deformity is noted of the spine and he is now able to move the head and neck with conversation (From patient is very rigid during conversation with cervical mobility especially to the left). Palpation:  Gross tenderness to palpation and spasm of left cervical paraspinals, scalenes, and suboccipitals. Flexibility moderately (From severely) limited of bilateral pectoralis minor and major. Left upper trapezius moderately (From severely) limited, right moderately. Left levator scapulae limited moderately (From severely), right limited moderately. Suboccipitals limited moderately (From severely) bilaterally. Vertebral-Basilar Screen: Hautant's test is negative. Cranial extension test is negative.     ROM:   Cervical extension (degrees): 40° with pain into left shoulder and neck   Cervical flexion: 60°   Cervical left side bend: 25° with neck pain   Cervical right side bend: 35°   Cervical left rotation: 30°   Cervical right rotation: 50° Strength: With upper quarter screen grossly symmetrical and WNL.  on the right is 60 lbs, left is 50 lbs. Scapular retractors 4/5 (From 3/5). Deep cervical flexors 3/5. Thoracic spinal extensors 3/5. Joint Mobility:  Moderate (From severe) limitations with traction, side glides and posterior to anterior glides not tested due to presence of radicular symptoms. Special Tests:  Ligament stress tests performed through upper cervical spine for transverse ligament including Sharp-Gordo test is negative, and Hovland-Axis shear test is negative. Hovland-Axis side flexion test for integrity of alar ligament is negative. Spurling test is positive for neck pain and radicular pain into the left shoulder. Cervical distraction is positive for symptom relief. Neurovascular testing for thoracic outlet syndrome is negative. Neurological Screen:  Myotomes: Key muscle strength testing for bilateral UE is WNL. Dermatomes: Sensation testing through bilateral upper quadrants for light touch is WNL. Reflexes: Biceps (C5), brachioradialis (C6), and triceps (C7) are 1+, 1+ and 1+. Neural tension tests: Upper limb tension test is negative. Slump test is negative. Functional Mobility:  Patient is grossly independent to modified independence with most ADLs and household chores. He requires assistance with moderate to heavy lifting when working on cars with his grandson, and especially when looking up using lift in his shop. Body Structures Involved:  1. Nerves  2. Bones  3. Joints  4. Muscles  5. Ligaments Body Functions Affected:  1. Sensory/Pain  2. Neuromusculoskeletal Activities and Participation Affected:  1. General Tasks and Demands  2. Working on and restoring cars    Number of elements (examined above) that affect the Plan of Care: 3: MODERATE COMPLEXITY   CLINICAL PRESENTATION:   Presentation: Stable and uncomplicated: LOW COMPLEXITY   CLINICAL DECISION MAKING:   Outcome Measure:    Tool Used: Neck Disability Index (NDI)  Score:  Initial: 8/50  Most Recent: 9/50 (Date: 2/10/2017)   Interpretation of Score: The Neck Disability Index is a revised form of the Oswestry Low Back Pain Index and is designed to measure the activities of daily living in person's with neck pain. Each section is scored on a 0-5 scale, 5 representing the greatest disability. The scores of each section are added together for a total score of 50. Score 0 1-10 11-20 21-30 31-40 41-49 50   Modifier CH CI CJ CK CL CM CN     ? Changing and Maintaining Body Position:     - CURRENT STATUS: CI - 1%-19% impaired, limited or restricted - upper limit    - GOAL STATUS: CI - 1%-19% impaired, limited or restricted - lower limit    - D/C STATUS:  ---------------To be determined---------------     Medical Necessity:   · Patient is expected to demonstrate progress in strength, range of motion, balance, coordination and functional technique to improve tolerance to lifting, carrying, pushing, and pulling as necessary to work on cars with his grandson. · Skilled intervention continues to be required due to weakness, decreased ROM, radicular symptoms, pain, postural dysfunction, and functional limitations. Reason for Services/Other Comments:  · Patient continues to require skilled intervention due to increasing complexity of exercises. Use of outcome tool(s) and clinical judgement create a POC that gives a: Clear prediction of patient's progress: LOW COMPLEXITY            TREATMENT:   (In addition to Assessment/Re-Assessment sessions the following treatments were rendered)    Pre-treatment Symptoms/Complaints:  Patient reported feeling almost 100% better but is still struggling with sleeping on his left side. Pain: Initial:   Pain Intensity 1: 0  Pain Location 1: Neck  Pain Orientation 1: Left  Post Session:  0/10     Therapeutic Exercise: (35 Minutes):  Exercises per grid below to improve mobility, strength, balance and coordination. Required minimal visual, verbal and manual cues to promote proper body alignment, promote proper body posture, promote proper body mechanics and promote proper body breathing techniques. Progressed resistance, range, repetitions and complexity of movement as indicated. (used abbreviations: BET-back education training) Date:  2/24/2017 Date:  2-27-17 Date:  2-20-17   Activity/Exercise Parameters Parameters Parameters   Arm bike 4 forward, 4 backward, level 4 4 forward, 4 backward level 6 4 min forward  6 min back, level 4   Seated scapular retraction - Band rows seated Blue 3 x 10 Blue 3 x 10 Blue 3 X 10   Seated pulldowns Blue 3 x 10 Blue 3 x 10 Blue 3 x 10   Levator scapulae stretch --- 2 x 30 sec ---   Doorway stretch 3 x 30 sec 3 x 30 sec 3 x 30 sec   Upper thoracic stretch 3 x 30 sec 3 x 30 sec 3 x 30 sec   Chest stretch 3 x 30 sec 2 x 30 sec Hands clasp behind back 3 x 30 sec   Supine cervical chin tucks 5 sec x 10 5 sec x 10 seated 5 sec x 8 with manual cuing with legs propped           Manual Therapy (    Soft Tissue Mobilization Duration  Duration: 20 Minutes): improve joint and soft tissue mobility  · Supine traction of the cervical spine  · Supine suboccipital traction with retraction of the cervical spine  · Supine suboccipital release  · Left sided stretching of upper trapezius and levator scapulae  · Deep tissue mobilization of left sided cervical paraspinals and suboccipitals in sitting. · Left inferior 1st rib mobilizations with scalenes stretching  ·   (Used abbreviations: MET - muscle energy technique; PNF - proprioceptive neuromuscular facilitation; NMR - neuromuscular re-education; a/p - anterior to posterior; p/a - posterior to anterio; NT - not tested)    Therapeutic Modalities: for edema and pain                                                                                               HEP: As above; handouts given to patient for all exercises.       Treatment/Session Assessment: · Response to Treatment:  Patient reported 0/10 pain at the end of session. Tolerated session well. Continue to progress strengthening and posture. He was advised to obtain an over the counter pneumatic traction unit for home before he is discharged from therapy. · Compliance with Program/Exercises: compliant most of the time. · Recommendations/Intent for next treatment session: \"Next visit will focus on advancements to more challenging activities\".     Total Treatment Duration: 55 minutes  PT Patient Time In/Time Out  Time In: 1000  Time Out: 130 [facility]

## 2017-03-03 ENCOUNTER — HOSPITAL ENCOUNTER (OUTPATIENT)
Dept: PHYSICAL THERAPY | Age: 72
Discharge: HOME | End: 2017-03-03
Payer: MEDICARE

## 2017-03-03 PROCEDURE — 97110 THERAPEUTIC EXERCISES: CPT

## 2017-03-03 PROCEDURE — 97140 MANUAL THERAPY 1/> REGIONS: CPT

## 2017-03-03 NOTE — PROGRESS NOTES
Melina Esparza  :  [facility] at [address], [place], [address] [place] [address]  [phone]   [phone]         OUTPATIENT PHYSICAL THERAPY:Daily Note 3/3/2017    ICD-10: Treatment Diagnosis 1: neck pain (M54.2)              Treatment Diagnosis 2: cervical radiculopathy (M54.12)  Precautions/Allergies:   Percocet [oxycodone-acetaminophen]   Fall Risk Score: 2 (? 5 = High Risk)  MD Orders: Evaluate and Treat MEDICAL/REFERRING DIAGNOSIS:  Cervicalgia [M54.2]   DATE OF ONSET: beginning of 2017  REFERRING PHYSICIAN: Troy Key NP  RETURN PHYSICIAN APPOINTMENT: not scheduled     INITIAL ASSESSMENT:  Mr. Soniya Rose is a 66year old male presenting with neck pain and radicular pain into the top of his head and down to his shoulder that started in the beginning of 2017. He reported that he has been working on cars in a shop at home on a lift, which requires him to look up for long periods of time. Patient has been seen for 6 sessions of therapy from 2017 to 2/10/2017 with significant success. He reports significant improvements with working on his cars, sleeping, and daily activities. However, he still experiences intermittent spasm and pain on the left side especially when sleeping at times and looking up for long periods of times. He is eager to improve tolerance to working on his cars, which he does almost every day. He is a good candidate for continued skilled intervention with services to include manual therapy, modalities as needed, therapeutic exercises, postural re-education and activity modification. PROBLEM LIST (Impacting functional limitations):  1. Decreased Strength  2. Decreased ADL/Functional Activities  3. Decreased Transfer Abilities  4. Decreased Activity Tolerance  5. Increased Fatigue  6. Decreased Flexibility/Joint Mobility INTERVENTIONS PLANNED:  1. Cold  2. Electrical Stimulation  3. Heat  4.  Home Exercise Program (HEP)  5. Manual Therapy  6. Neuromuscular Re-education/Strengthening  7. Range of Motion (ROM)  8. Therapeutic Exercise/Strengthening  9. Ultrasound (US)   TREATMENT PLAN:  Effective Dates: 1/26/2017 TO 3/9/2017. Frequency/Duration: 2 times a week for 6 weeks  GOALS: (Goals have been discussed and agreed upon with patient.)  Short-Term Functional Goals: Time Frame: 1/26/2017 to 2/16/2017  1. Patient demonstrates independence with home exercise program without verbal cueing provided by therapist. - GOAL MET  2. Improve left arm and head radicular symptoms with performance of HEP and use of traction. - GOAL MET  3. Improve posture with decreased forward head, forward shoulders, and thoracic kyphosis in sitting and standing. - GOAL MET  Discharge Goals: Time Frame: 1/26/2017 to 3/9/2017  1. Improve pain to 3/10 at the most with working on his cars at home, looking up for house repairs, and sleeping. - ONGOING  2. Improve strength of deep cervical flexors, scapular retractors, and thoracic spinal extensors to at least 4/5 in order to improve tolerance to working on cars at home. - ONGOING  3. Improve form with lifting/carrying/pushing/pulling activities, as well as neutral spine with overhead activities. - GOAL MET  4. Improve tenderness to palpation and spasm of left cervical paraspinals, suboccipitals, and scalenes in order to decrease pain with sleeping. - GOAL MET  5. Improve tolerance to looking up working on cars for up to 10 minutes before the need to rest due to neck pain. - ONGOING  6. Improve Neck Disability Index score to 2/50 from 8/50. - ONGOING  Rehabilitation Potential For Stated Goals: Good  Regarding Julia Dyer's therapy, I certify that the treatment plan above will be carried out by a therapist or under their direction.   Thank you for this referral,  Caitlin Barnes PTA     Referring Physician Signature: Bela Caldera NP              Date                    The information in this section was collected on 1/26/2017 (except where otherwise noted). HISTORY:   History of Present Injury/Illness (Reason for Referral):  Mr. Ayad Miles is a 66year old male presenting with neck pain and radicular pain into the top of his head and down to his shoulder that started in the beginning of January 2017. He reported that he has been working on cars in a shop at home on a lift, which requires him to look up for long periods of time. He reports that looking up, especially for long periods of time or into full extension creates his neck and radiating pain the most.  He denies weakness and tingling at this time, but reports that he has been unable to work on cars like he used to due to the pain. He is eager to improve tolerance to this activity, as well as any other activities requiring extended extension of the neck and head. Past Medical History/Comorbidities:   Mr. Ayad Miles  has a past medical history of Cancer ([facility] [address] (2006); Chronic maxillary sinusitis (8/14/2015); Diverticulitis; H/O colostomy (11/2012); and Microscopic hematuria (8/14/2015). He also has no past medical history of Abuse or Headache(784.0). Mr. Ayad Miles  has a past surgical history that includes colostomy (11/2012); hernia repair (1970s); colostomy take down (1/31/13); tonsillectomy; carpal tunnel release; appendectomy (11/2012); and other surgical (4/2014). Social History/Living Environment:    Patient lives at home with wife and reports general independence to modified independence with household chores and ADLs. Requires assistance with moderate to heavy lifting and overhead activities due to neck pain. Prior Level of Function/Work/Activity:  Independent without dysfunction. 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   Dominant Side:         RIGHT  Current Medications:       Current Outpatient Prescriptions:     tamsulosin (FLOMAX) 0.4 mg capsule, Take 0.4 mg by mouth daily., Disp: , Rfl:     finasteride (PROSCAR) 5 mg tablet, Take 5 mg by mouth nightly., Disp: , Rfl:    Date Last Reviewed:  3/3/2017   Number of Personal Factors/Comorbidities that affect the Plan of Care: 0: LOW COMPLEXITY   EXAMINATION:     Patient denies any increase of symptoms with coughing, sneezing or valsalva maneuver. Patient denies any headaches, changes in vision, dizziness, vertigo, nausea, drop attacks, black outs, tinnitus, dysphagia, dysarthria, LE symptoms or bowel/bladder dysfunction. Observation/Orthostatic Postural Assessment:  Patient sits with minimal (From moderate forward head, forward shoulders, and thoracic kyphosis). He is able to reverse this with cuing and is able to hold it for longer periods of time with and without cuing (From is unable to hold the position for long due to weakness and stiffness). He still has difficulty with neutral head positioning but is able to hold the position with verbal and manual cuing. No significant deformity is noted of the spine and he is now able to move the head and neck with conversation (From patient is very rigid during conversation with cervical mobility especially to the left). Palpation:  Gross tenderness to palpation and spasm of left cervical paraspinals, scalenes, and suboccipitals. Flexibility moderately (From severely) limited of bilateral pectoralis minor and major. Left upper trapezius moderately (From severely) limited, right moderately. Left levator scapulae limited moderately (From severely), right limited moderately. Suboccipitals limited moderately (From severely) bilaterally. Vertebral-Basilar Screen: Hautant's test is negative. Cranial extension test is negative.     ROM:   Cervical extension (degrees): 40° with pain into left shoulder and neck   Cervical flexion: 60°   Cervical left side bend: 25° with neck pain   Cervical right side bend: 35°   Cervical left rotation: 30°   Cervical right rotation: 50° Strength: With upper quarter screen grossly symmetrical and WNL.  on the right is 60 lbs, left is 50 lbs. Scapular retractors 4/5 (From 3/5). Deep cervical flexors 3/5. Thoracic spinal extensors 3/5. Joint Mobility:  Moderate (From severe) limitations with traction, side glides and posterior to anterior glides not tested due to presence of radicular symptoms. Special Tests:  Ligament stress tests performed through upper cervical spine for transverse ligament including Sharp-Gordo test is negative, and Nineveh-Axis shear test is negative. Nineveh-Axis side flexion test for integrity of alar ligament is negative. Spurling test is positive for neck pain and radicular pain into the left shoulder. Cervical distraction is positive for symptom relief. Neurovascular testing for thoracic outlet syndrome is negative. Neurological Screen:  Myotomes: Key muscle strength testing for bilateral UE is WNL. Dermatomes: Sensation testing through bilateral upper quadrants for light touch is WNL. Reflexes: Biceps (C5), brachioradialis (C6), and triceps (C7) are 1+, 1+ and 1+. Neural tension tests: Upper limb tension test is negative. Slump test is negative. Functional Mobility:  Patient is grossly independent to modified independence with most ADLs and household chores. He requires assistance with moderate to heavy lifting when working on cars with his grandson, and especially when looking up using lift in his shop. Body Structures Involved:  1. Nerves  2. Bones  3. Joints  4. Muscles  5. Ligaments Body Functions Affected:  1. Sensory/Pain  2. Neuromusculoskeletal Activities and Participation Affected:  1. General Tasks and Demands  2. Working on and restoring cars    Number of elements (examined above) that affect the Plan of Care: 3: MODERATE COMPLEXITY   CLINICAL PRESENTATION:   Presentation: Stable and uncomplicated: LOW COMPLEXITY   CLINICAL DECISION MAKING:   Outcome Measure:    Tool Used: Neck Disability Index (NDI)  Score:  Initial: 8/50  Most Recent: 9/50 (Date: 2/10/2017)   Interpretation of Score: The Neck Disability Index is a revised form of the Oswestry Low Back Pain Index and is designed to measure the activities of daily living in person's with neck pain. Each section is scored on a 0-5 scale, 5 representing the greatest disability. The scores of each section are added together for a total score of 50. Score 0 1-10 11-20 21-30 31-40 41-49 50   Modifier CH CI CJ CK CL CM CN     ? Changing and Maintaining Body Position:     - CURRENT STATUS: CI - 1%-19% impaired, limited or restricted - upper limit    - GOAL STATUS: CI - 1%-19% impaired, limited or restricted - lower limit    - D/C STATUS:  ---------------To be determined---------------     Medical Necessity:   · Patient is expected to demonstrate progress in strength, range of motion, balance, coordination and functional technique to improve tolerance to lifting, carrying, pushing, and pulling as necessary to work on cars with his grandson. · Skilled intervention continues to be required due to weakness, decreased ROM, radicular symptoms, pain, postural dysfunction, and functional limitations. Reason for Services/Other Comments:  · Patient continues to require skilled intervention due to increasing complexity of exercises. Use of outcome tool(s) and clinical judgement create a POC that gives a: Clear prediction of patient's progress: LOW COMPLEXITY            TREATMENT:   (In addition to Assessment/Re-Assessment sessions the following treatments were rendered)    Pre-treatment Symptoms/Complaints:  Patient reports increased left cervical pain due to working on race car and continues to have difficulty sleeping.     Pain: Initial:   Pain Intensity 1: 3  Pain Location 1: Neck  Pain Orientation 1: Left  Post Session:  2/10     Therapeutic Exercise: (30 Minutes):  Exercises per grid below to improve mobility, strength, balance and coordination. Required minimal visual, verbal and manual cues to promote proper body alignment, promote proper body posture, promote proper body mechanics and promote proper body breathing techniques. Progressed resistance, range, repetitions and complexity of movement as indicated. (used abbreviations: BET-back education training) Date:  2/24/2017 Date:  2-27-17 Date:  3-2-17   Activity/Exercise Parameters Parameters Parameters   Arm bike 4 forward, 4 backward, level 4 4 forward, 4 backward level 6 4 min forward  4 min back, level 4   Seated scapular retraction - Band rows seated Blue 3 x 10 Blue 3 x 10 Blue 3 X 10   Seated pulldowns Blue 3 x 10 Blue 3 x 10 Blue 3 x 10   Levator scapulae stretch --- 2 x 30 sec 2 x 30 sec   Doorway stretch 3 x 30 sec 3 x 30 sec 3 x 30 sec   Upper thoracic stretch 3 x 30 sec 3 x 30 sec 3 x 30 sec   Chest stretch 3 x 30 sec 2 x 30 sec Hands clasp behind back 3 x 30 sec   Supine cervical chin tucks 5 sec x 10 5 sec x 10 seated 5 sec x 8 with manual cuing with legs propped           Manual Therapy (    Soft Tissue Mobilization Duration  Duration: 30 Minutes): improve joint and soft tissue mobility  · Supine traction of the cervical spine  · Supine suboccipital traction with retraction of the cervical spine  · Supine suboccipital release  · Left sided stretching of upper trapezius and levator scapulae  · Deep tissue mobilization of left sided cervical paraspinals and suboccipitals in sitting.    · Left inferior 1st rib mobilizations with scalenes stretching  ·   (Used abbreviations: MET - muscle energy technique; PNF - proprioceptive neuromuscular facilitation; NMR - neuromuscular re-education; a/p - anterior to posterior; p/a - posterior to anterio; NT - not tested)    Therapeutic Modalities: for edema and pain                                                                                               HEP: As above; handouts given to patient for all exercises. Treatment/Session Assessment:    · Response to Treatment:  Minimal relief with therapy today however reports improved cervical mobility. .  · Compliance with Program/Exercises: compliant most of the time. · Recommendations/Intent for next treatment session: \"Next visit will focus on advancements to more challenging activities\".     Total Treatment Duration: 60 minutes  PT Patient Time In/Time Out  Time In: 0900  Time Out: [address] [place], PTA

## 2017-03-06 ENCOUNTER — HOSPITAL ENCOUNTER (OUTPATIENT)
Dept: PHYSICAL THERAPY | Age: 72
Discharge: HOME | End: 2017-03-06
Payer: MEDICARE

## 2017-03-06 PROCEDURE — 97140 MANUAL THERAPY 1/> REGIONS: CPT

## 2017-03-06 PROCEDURE — 97110 THERAPEUTIC EXERCISES: CPT

## 2017-03-06 NOTE — PROGRESS NOTES
Eddie Rock  :  [facility] at [address], [address]  [phone]   [phone]         OUTPATIENT PHYSICAL THERAPY:Daily Note 3/6/2017    ICD-10: Treatment Diagnosis 1: neck pain (M54.2)              Treatment Diagnosis 2: cervical radiculopathy (M54.12)  Precautions/Allergies:   Percocet [oxycodone-acetaminophen]   Fall Risk Score: 2 (? 5 = High Risk)  MD Orders: Evaluate and Treat MEDICAL/REFERRING DIAGNOSIS:  Cervicalgia [M54.2]   DATE OF ONSET: beginning of 2017  REFERRING PHYSICIAN: Chelsea Combs NP  RETURN PHYSICIAN APPOINTMENT: not scheduled     INITIAL ASSESSMENT:  Mr. José Pak is a 66year old male presenting with neck pain and radicular pain into the top of his head and down to his shoulder that started in the beginning of 2017. He reported that he has been working on cars in a shop at home on a lift, which requires him to look up for long periods of time. Patient has been seen for 6 sessions of therapy from 2017 to 2/10/2017 with significant success. He reports significant improvements with working on his cars, sleeping, and daily activities. However, he still experiences intermittent spasm and pain on the left side especially when sleeping at times and looking up for long periods of times. He is eager to improve tolerance to working on his cars, which he does almost every day. He is a good candidate for continued skilled intervention with services to include manual therapy, modalities as needed, therapeutic exercises, postural re-education and activity modification. PROBLEM LIST (Impacting functional limitations):  1. Decreased Strength  2. Decreased ADL/Functional Activities  3. Decreased Transfer Abilities  4. Decreased Activity Tolerance  5. Increased Fatigue  6. Decreased Flexibility/Joint Mobility INTERVENTIONS PLANNED:  1. Cold  2. Electrical Stimulation  3. Heat  4.  Home Exercise Program (HEP)  5. Manual Therapy  6. Neuromuscular Re-education/Strengthening  7. Range of Motion (ROM)  8. Therapeutic Exercise/Strengthening  9. Ultrasound (US)   TREATMENT PLAN:  Effective Dates: 1/26/2017 TO 3/9/2017. Frequency/Duration: 2 times a week for 6 weeks  GOALS: (Goals have been discussed and agreed upon with patient.)  Short-Term Functional Goals: Time Frame: 1/26/2017 to 2/16/2017  1. Patient demonstrates independence with home exercise program without verbal cueing provided by therapist. - GOAL MET  2. Improve left arm and head radicular symptoms with performance of HEP and use of traction. - GOAL MET  3. Improve posture with decreased forward head, forward shoulders, and thoracic kyphosis in sitting and standing. - GOAL MET  Discharge Goals: Time Frame: 1/26/2017 to 3/9/2017  1. Improve pain to 3/10 at the most with working on his cars at home, looking up for house repairs, and sleeping. - ONGOING  2. Improve strength of deep cervical flexors, scapular retractors, and thoracic spinal extensors to at least 4/5 in order to improve tolerance to working on cars at home. - ONGOING  3. Improve form with lifting/carrying/pushing/pulling activities, as well as neutral spine with overhead activities. - GOAL MET  4. Improve tenderness to palpation and spasm of left cervical paraspinals, suboccipitals, and scalenes in order to decrease pain with sleeping. - GOAL MET  5. Improve tolerance to looking up working on cars for up to 10 minutes before the need to rest due to neck pain. - ONGOING  6. Improve Neck Disability Index score to 2/50 from 8/50. - ONGOING  Rehabilitation Potential For Stated Goals: Good  Regarding Kelly Dyer's therapy, I certify that the treatment plan above will be carried out by a therapist or under their direction.   Thank you for this referral,  Rogelio Paniagua PT     Referring Physician Signature: Kasi Claudio NP              Date                    The information in this section was collected on 1/26/2017 (except where otherwise noted). HISTORY:   History of Present Injury/Illness (Reason for Referral):  Mr. Stanton Wilhelm is a 66year old male presenting with neck pain and radicular pain into the top of his head and down to his shoulder that started in the beginning of January 2017. He reported that he has been working on cars in a shop at home on a lift, which requires him to look up for long periods of time. He reports that looking up, especially for long periods of time or into full extension creates his neck and radiating pain the most.  He denies weakness and tingling at this time, but reports that he has been unable to work on cars like he used to due to the pain. He is eager to improve tolerance to this activity, as well as any other activities requiring extended extension of the neck and head. Past Medical History/Comorbidities:   Mr. Stanton Wilhelm  has a past medical history of Cancer ([facility] [address] (2006); Chronic maxillary sinusitis (8/14/2015); Diverticulitis; H/O colostomy (11/2012); and Microscopic hematuria (8/14/2015). He also has no past medical history of Abuse or Headache(784.0). Mr. Stanton Wilhelm  has a past surgical history that includes colostomy (11/2012); hernia repair (1970s); colostomy take down (1/31/13); tonsillectomy; carpal tunnel release; appendectomy (11/2012); and other surgical (4/2014). Social History/Living Environment:    Patient lives at home with wife and reports general independence to modified independence with household chores and ADLs. Requires assistance with moderate to heavy lifting and overhead activities due to neck pain. Prior Level of Function/Work/Activity:  Independent without dysfunction. 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   Dominant Side:         RIGHT  Current Medications:       Current Outpatient Prescriptions:     tamsulosin (FLOMAX) 0.4 mg capsule, Take 0.4 mg by mouth daily., Disp: , Rfl:     finasteride (PROSCAR) 5 mg tablet, Take 5 mg by mouth nightly., Disp: , Rfl:    Date Last Reviewed:  3/6/2017   Number of Personal Factors/Comorbidities that affect the Plan of Care: 0: LOW COMPLEXITY   EXAMINATION:     Patient denies any increase of symptoms with coughing, sneezing or valsalva maneuver. Patient denies any headaches, changes in vision, dizziness, vertigo, nausea, drop attacks, black outs, tinnitus, dysphagia, dysarthria, LE symptoms or bowel/bladder dysfunction. Observation/Orthostatic Postural Assessment:  Patient sits with minimal (From moderate forward head, forward shoulders, and thoracic kyphosis). He is able to reverse this with cuing and is able to hold it for longer periods of time with and without cuing (From is unable to hold the position for long due to weakness and stiffness). He still has difficulty with neutral head positioning but is able to hold the position with verbal and manual cuing. No significant deformity is noted of the spine and he is now able to move the head and neck with conversation (From patient is very rigid during conversation with cervical mobility especially to the left). Palpation:  Gross tenderness to palpation and spasm of left cervical paraspinals, scalenes, and suboccipitals. Flexibility moderately (From severely) limited of bilateral pectoralis minor and major. Left upper trapezius moderately (From severely) limited, right moderately. Left levator scapulae limited moderately (From severely), right limited moderately. Suboccipitals limited moderately (From severely) bilaterally. Vertebral-Basilar Screen: Hautant's test is negative. Cranial extension test is negative.     ROM:   Cervical extension (degrees): 40° with pain into left shoulder and neck   Cervical flexion: 60°   Cervical left side bend: 25° with neck pain   Cervical right side bend: 35°   Cervical left rotation: 30°   Cervical right rotation: 50° Strength: With upper quarter screen grossly symmetrical and WNL.  on the right is 60 lbs, left is 50 lbs. Scapular retractors 4/5 (From 3/5). Deep cervical flexors 3/5. Thoracic spinal extensors 3/5. Joint Mobility:  Moderate (From severe) limitations with traction, side glides and posterior to anterior glides not tested due to presence of radicular symptoms. Special Tests:  Ligament stress tests performed through upper cervical spine for transverse ligament including Sharp-Gordo test is negative, and Vernon-Axis shear test is negative. Vernon-Axis side flexion test for integrity of alar ligament is negative. Spurling test is positive for neck pain and radicular pain into the left shoulder. Cervical distraction is positive for symptom relief. Neurovascular testing for thoracic outlet syndrome is negative. Neurological Screen:  Myotomes: Key muscle strength testing for bilateral UE is WNL. Dermatomes: Sensation testing through bilateral upper quadrants for light touch is WNL. Reflexes: Biceps (C5), brachioradialis (C6), and triceps (C7) are 1+, 1+ and 1+. Neural tension tests: Upper limb tension test is negative. Slump test is negative. Functional Mobility:  Patient is grossly independent to modified independence with most ADLs and household chores. He requires assistance with moderate to heavy lifting when working on cars with his grandson, and especially when looking up using lift in his shop. Body Structures Involved:  1. Nerves  2. Bones  3. Joints  4. Muscles  5. Ligaments Body Functions Affected:  1. Sensory/Pain  2. Neuromusculoskeletal Activities and Participation Affected:  1. General Tasks and Demands  2. Working on and restoring cars    Number of elements (examined above) that affect the Plan of Care: 3: MODERATE COMPLEXITY   CLINICAL PRESENTATION:   Presentation: Stable and uncomplicated: LOW COMPLEXITY   CLINICAL DECISION MAKING:   Outcome Measure:    Tool Used: Neck Disability Index (NDI)  Score:  Initial: 8/50  Most Recent: 9/50 (Date: 2/10/2017)   Interpretation of Score: The Neck Disability Index is a revised form of the Oswestry Low Back Pain Index and is designed to measure the activities of daily living in person's with neck pain. Each section is scored on a 0-5 scale, 5 representing the greatest disability. The scores of each section are added together for a total score of 50. Score 0 1-10 11-20 21-30 31-40 41-49 50   Modifier CH CI CJ CK CL CM CN     ? Changing and Maintaining Body Position:     - CURRENT STATUS: CI - 1%-19% impaired, limited or restricted - upper limit    - GOAL STATUS: CI - 1%-19% impaired, limited or restricted - lower limit    - D/C STATUS:  ---------------To be determined---------------     Medical Necessity:   · Patient is expected to demonstrate progress in strength, range of motion, balance, coordination and functional technique to improve tolerance to lifting, carrying, pushing, and pulling as necessary to work on cars with his grandson. · Skilled intervention continues to be required due to weakness, decreased ROM, radicular symptoms, pain, postural dysfunction, and functional limitations. Reason for Services/Other Comments:  · Patient continues to require skilled intervention due to increasing complexity of exercises. Use of outcome tool(s) and clinical judgement create a POC that gives a: Clear prediction of patient's progress: LOW COMPLEXITY            TREATMENT:   (In addition to Assessment/Re-Assessment sessions the following treatments were rendered)    Pre-treatment Symptoms/Complaints:  Patient reported difficulty sleeping last night but overall has experienced improvements. Pain: Initial:   Pain Intensity 1: 3  Pain Location 1: Neck  Pain Orientation 1: Left  Post Session:  0/10     Therapeutic Exercise: (30 Minutes):  Exercises per grid below to improve mobility, strength, balance and coordination.   Required minimal visual, verbal and manual cues to promote proper body alignment, promote proper body posture, promote proper body mechanics and promote proper body breathing techniques. Progressed resistance, range, repetitions and complexity of movement as indicated. (used abbreviations: BET-back education training) Date:  3/6/2017 Date:  2-27-17 Date:  3-2-17   Activity/Exercise Parameters Parameters Parameters   Arm bike 4 forward, 4 backward, level 4 4 forward, 4 backward level 6 4 min forward  4 min back, level 4   Seated scapular retraction - Band rows seated Blue 3 x 10 Blue 3 x 10 Blue 3 X 10   Seated pulldowns Blue 3 x 10 Blue 3 x 10 Blue 3 x 10   Levator scapulae stretch 3 x 30 sec 2 x 30 sec 2 x 30 sec   Doorway stretch 3 x 30 sec 3 x 30 sec 3 x 30 sec   Upper thoracic stretch 3 x 30 sec 3 x 30 sec 3 x 30 sec   Chest stretch 3 x 30 sec 2 x 30 sec Hands clasp behind back 3 x 30 sec   Supine cervical chin tucks 5 sec x 10 5 sec x 10 seated 5 sec x 8 with manual cuing with legs propped           Manual Therapy (    Soft Tissue Mobilization Duration  Duration: 25 Minutes): improve joint and soft tissue mobility  · Supine traction of the cervical spine  · Supine suboccipital traction with retraction of the cervical spine  · Supine suboccipital release  · Left sided stretching of upper trapezius and levator scapulae  · Deep tissue mobilization of left sided cervical paraspinals and suboccipitals in sitting. · Left inferior 1st rib mobilizations with scalenes stretching  (Used abbreviations: MET - muscle energy technique; PNF - proprioceptive neuromuscular facilitation; NMR - neuromuscular re-education; a/p - anterior to posterior; p/a - posterior to anterio; NT - not tested)    Therapeutic Modalities: for edema and pain                                                                                               HEP: As above; handouts given to patient for all exercises.       Treatment/Session Assessment: · Response to Treatment:  Patient reported less pain overall at the end of session to 0/10. Tolerated session well and will plan to discharge next session. Discussed the possibility of him utilizing massage, home traction, and over the counter medication as necessary. Otherwise, he was suggested to see a specialist for further treatment like medications and injections if necessary. · Compliance with Program/Exercises: compliant most of the time. · Recommendations/Intent for next treatment session: \"Next visit will focus on advancements to more challenging activities\".     Total Treatment Duration: 55 minutes  PT Patient Time In/Time Out  Time In: 0905  Time Out: [address] [place], [place]

## 2017-03-09 ENCOUNTER — HOSPITAL ENCOUNTER (OUTPATIENT)
Dept: PHYSICAL THERAPY | Age: 72
End: 2017-03-09
Payer: MEDICARE

## 2017-03-13 ENCOUNTER — HOSPITAL ENCOUNTER (OUTPATIENT)
Dept: PHYSICAL THERAPY | Age: 72
Discharge: HOME | End: 2017-03-13
Payer: MEDICARE

## 2017-03-13 NOTE — PROGRESS NOTES
Patient cancelled his appointment today and did not provide a reason.     Savannah Sam, DPT  Physical Therapist

## 2017-11-13 ENCOUNTER — HOSPITAL ENCOUNTER (EMERGENCY)
Age: 72
Discharge: HOME | End: 2017-11-14
Attending: EMERGENCY MEDICINE
Payer: MEDICARE

## 2017-11-13 ENCOUNTER — APPOINTMENT (OUTPATIENT)
Dept: ULTRASOUND IMAGING | Age: 72
End: 2017-11-13
Attending: EMERGENCY MEDICINE
Payer: MEDICARE

## 2017-11-13 ENCOUNTER — APPOINTMENT (OUTPATIENT)
Dept: CT IMAGING | Age: 72
End: 2017-11-13
Attending: EMERGENCY MEDICINE
Payer: MEDICARE

## 2017-11-13 ENCOUNTER — APPOINTMENT (OUTPATIENT)
Dept: GENERAL RADIOLOGY | Age: 72
End: 2017-11-13
Attending: EMERGENCY MEDICINE
Payer: MEDICARE

## 2017-11-13 DIAGNOSIS — K80.20 CALCULUS OF GALLBLADDER WITHOUT CHOLECYSTITIS WITHOUT OBSTRUCTION: Primary | ICD-10-CM

## 2017-11-13 DIAGNOSIS — R07.9 CHEST PAIN, UNSPECIFIED TYPE: ICD-10-CM

## 2017-11-13 LAB
ALBUMIN SERPL-MCNC: 3.4 G/DL (ref 3.2–4.6)
ALBUMIN/GLOB SERPL: 1 {RATIO} (ref 1.2–3.5)
ALP SERPL-CCNC: 80 U/L (ref 50–136)
ALT SERPL-CCNC: 24 U/L (ref 12–65)
ANION GAP SERPL CALC-SCNC: 15 MMOL/L (ref 7–16)
AST SERPL-CCNC: 24 U/L (ref 15–37)
ATRIAL RATE: 52 BPM
ATRIAL RATE: 52 BPM
BASOPHILS # BLD: 0 K/UL (ref 0–0.2)
BASOPHILS NFR BLD: 0 % (ref 0–2)
BILIRUB SERPL-MCNC: 0.4 MG/DL (ref 0.2–1.1)
BUN SERPL-MCNC: 23 MG/DL (ref 8–23)
CALCIUM SERPL-MCNC: 8.7 MG/DL (ref 8.3–10.4)
CALCULATED P AXIS, ECG09: 59 DEGREES
CALCULATED P AXIS, ECG09: 59 DEGREES
CALCULATED R AXIS, ECG10: -44 DEGREES
CALCULATED R AXIS, ECG10: -45 DEGREES
CALCULATED T AXIS, ECG11: 16 DEGREES
CALCULATED T AXIS, ECG11: 18 DEGREES
CHLORIDE SERPL-SCNC: 108 MMOL/L (ref 98–107)
CO2 SERPL-SCNC: 21 MMOL/L (ref 21–32)
CREAT SERPL-MCNC: 1.17 MG/DL (ref 0.8–1.5)
D DIMER PPP FEU-MCNC: 1.25 UG/ML(FEU)
DIAGNOSIS, 93000: NORMAL
DIAGNOSIS, 93000: NORMAL
DIFFERENTIAL METHOD BLD: ABNORMAL
EOSINOPHIL # BLD: 0.2 K/UL (ref 0–0.8)
EOSINOPHIL NFR BLD: 2 % (ref 0.5–7.8)
ERYTHROCYTE [DISTWIDTH] IN BLOOD BY AUTOMATED COUNT: 12.5 % (ref 11.9–14.6)
GLOBULIN SER CALC-MCNC: 3.3 G/DL (ref 2.3–3.5)
GLUCOSE SERPL-MCNC: 130 MG/DL (ref 65–100)
HCT VFR BLD AUTO: 41.4 % (ref 41.1–50.3)
HGB BLD-MCNC: 15 G/DL (ref 13.6–17.2)
IMM GRANULOCYTES # BLD: 0 K/UL (ref 0–0.5)
IMM GRANULOCYTES NFR BLD AUTO: 0 % (ref 0–5)
LIPASE SERPL-CCNC: 228 U/L (ref 73–393)
LYMPHOCYTES # BLD: 2.5 K/UL (ref 0.5–4.6)
LYMPHOCYTES NFR BLD: 28 % (ref 13–44)
MCH RBC QN AUTO: 31.6 PG (ref 26.1–32.9)
MCHC RBC AUTO-ENTMCNC: 36.2 G/DL (ref 31.4–35)
MCV RBC AUTO: 87.3 FL (ref 79.6–97.8)
MONOCYTES # BLD: 0.7 K/UL (ref 0.1–1.3)
MONOCYTES NFR BLD: 7 % (ref 4–12)
NEUTS SEG # BLD: 5.7 K/UL (ref 1.7–8.2)
NEUTS SEG NFR BLD: 63 % (ref 43–78)
P-R INTERVAL, ECG05: 168 MS
PLATELET # BLD AUTO: 206 K/UL (ref 150–450)
PMV BLD AUTO: 9.5 FL (ref 10.8–14.1)
POTASSIUM SERPL-SCNC: 3.5 MMOL/L (ref 3.5–5.1)
PROT SERPL-MCNC: 6.7 G/DL (ref 6.3–8.2)
Q-T INTERVAL, ECG07: 450 MS
Q-T INTERVAL, ECG07: 458 MS
QRS DURATION, ECG06: 104 MS
QRS DURATION, ECG06: 88 MS
QTC CALCULATION (BEZET), ECG08: 417 MS
QTC CALCULATION (BEZET), ECG08: 418 MS
RBC # BLD AUTO: 4.74 M/UL (ref 4.23–5.67)
SODIUM SERPL-SCNC: 144 MMOL/L (ref 136–145)
TROPONIN I BLD-MCNC: 0 NG/ML (ref 0.02–0.05)
TROPONIN I BLD-MCNC: 0 NG/ML (ref 0.02–0.05)
VENTRICULAR RATE, ECG03: 50 BPM
VENTRICULAR RATE, ECG03: 52 BPM
WBC # BLD AUTO: 9.1 K/UL (ref 4.3–11.1)

## 2017-11-13 PROCEDURE — 74011000250 HC RX REV CODE- 250: Performed by: EMERGENCY MEDICINE

## 2017-11-13 PROCEDURE — 93005 ELECTROCARDIOGRAM TRACING: CPT | Performed by: EMERGENCY MEDICINE

## 2017-11-13 PROCEDURE — 96376 TX/PRO/DX INJ SAME DRUG ADON: CPT | Performed by: EMERGENCY MEDICINE

## 2017-11-13 PROCEDURE — 74011636320 HC RX REV CODE- 636/320: Performed by: EMERGENCY MEDICINE

## 2017-11-13 PROCEDURE — 71020 XR CHEST PA LAT: CPT

## 2017-11-13 PROCEDURE — 85025 COMPLETE CBC W/AUTO DIFF WBC: CPT | Performed by: EMERGENCY MEDICINE

## 2017-11-13 PROCEDURE — 74011250636 HC RX REV CODE- 250/636: Performed by: EMERGENCY MEDICINE

## 2017-11-13 PROCEDURE — 84484 ASSAY OF TROPONIN QUANT: CPT

## 2017-11-13 PROCEDURE — 74011000258 HC RX REV CODE- 258: Performed by: EMERGENCY MEDICINE

## 2017-11-13 PROCEDURE — 76705 ECHO EXAM OF ABDOMEN: CPT

## 2017-11-13 PROCEDURE — 71275 CT ANGIOGRAPHY CHEST: CPT

## 2017-11-13 PROCEDURE — 80053 COMPREHEN METABOLIC PANEL: CPT | Performed by: EMERGENCY MEDICINE

## 2017-11-13 PROCEDURE — 83690 ASSAY OF LIPASE: CPT | Performed by: EMERGENCY MEDICINE

## 2017-11-13 PROCEDURE — 85379 FIBRIN DEGRADATION QUANT: CPT | Performed by: EMERGENCY MEDICINE

## 2017-11-13 PROCEDURE — 74011250637 HC RX REV CODE- 250/637: Performed by: EMERGENCY MEDICINE

## 2017-11-13 PROCEDURE — 96375 TX/PRO/DX INJ NEW DRUG ADDON: CPT | Performed by: EMERGENCY MEDICINE

## 2017-11-13 PROCEDURE — 96374 THER/PROPH/DIAG INJ IV PUSH: CPT | Performed by: EMERGENCY MEDICINE

## 2017-11-13 PROCEDURE — 99285 EMERGENCY DEPT VISIT HI MDM: CPT | Performed by: EMERGENCY MEDICINE

## 2017-11-13 RX ORDER — SODIUM CHLORIDE 0.9 % (FLUSH) 0.9 %
10 SYRINGE (ML) INJECTION
Status: COMPLETED | OUTPATIENT
Start: 2017-11-13 | End: 2017-11-13

## 2017-11-13 RX ORDER — ONDANSETRON 2 MG/ML
4 INJECTION INTRAMUSCULAR; INTRAVENOUS
Status: COMPLETED | OUTPATIENT
Start: 2017-11-13 | End: 2017-11-13

## 2017-11-13 RX ORDER — FAMOTIDINE 10 MG/ML
20 INJECTION INTRAVENOUS
Status: COMPLETED | OUTPATIENT
Start: 2017-11-13 | End: 2017-11-13

## 2017-11-13 RX ORDER — METOCLOPRAMIDE HYDROCHLORIDE 5 MG/ML
5 INJECTION INTRAMUSCULAR; INTRAVENOUS
Status: COMPLETED | OUTPATIENT
Start: 2017-11-13 | End: 2017-11-13

## 2017-11-13 RX ORDER — PROCHLORPERAZINE EDISYLATE 5 MG/ML
5 INJECTION INTRAMUSCULAR; INTRAVENOUS
Status: COMPLETED | OUTPATIENT
Start: 2017-11-13 | End: 2017-11-13

## 2017-11-13 RX ORDER — HYDROMORPHONE HYDROCHLORIDE 1 MG/ML
0.5 INJECTION, SOLUTION INTRAMUSCULAR; INTRAVENOUS; SUBCUTANEOUS
Status: COMPLETED | OUTPATIENT
Start: 2017-11-13 | End: 2017-11-13

## 2017-11-13 RX ORDER — LIDOCAINE HYDROCHLORIDE 20 MG/ML
10 SOLUTION OROPHARYNGEAL ONCE
Status: COMPLETED | OUTPATIENT
Start: 2017-11-13 | End: 2017-11-13

## 2017-11-13 RX ADMIN — PROCHLORPERAZINE EDISYLATE 5 MG: 5 INJECTION INTRAMUSCULAR; INTRAVENOUS at 23:03

## 2017-11-13 RX ADMIN — LIDOCAINE HYDROCHLORIDE 10 ML: 20 SOLUTION ORAL; TOPICAL at 18:55

## 2017-11-13 RX ADMIN — SODIUM CHLORIDE 100 ML: 900 INJECTION, SOLUTION INTRAVENOUS at 21:08

## 2017-11-13 RX ADMIN — FAMOTIDINE 20 MG: 10 INJECTION, SOLUTION INTRAVENOUS at 18:55

## 2017-11-13 RX ADMIN — ONDANSETRON 4 MG: 2 INJECTION INTRAMUSCULAR; INTRAVENOUS at 18:55

## 2017-11-13 RX ADMIN — METOCLOPRAMIDE 5 MG: 5 INJECTION, SOLUTION INTRAMUSCULAR; INTRAVENOUS at 19:33

## 2017-11-13 RX ADMIN — Medication 30 ML: at 18:55

## 2017-11-13 RX ADMIN — IOPAMIDOL 100 ML: 755 INJECTION, SOLUTION INTRAVENOUS at 21:08

## 2017-11-13 RX ADMIN — HYDROMORPHONE HYDROCHLORIDE 0.5 MG: 1 INJECTION, SOLUTION INTRAMUSCULAR; INTRAVENOUS; SUBCUTANEOUS at 20:43

## 2017-11-13 RX ADMIN — ONDANSETRON 4 MG: 2 INJECTION INTRAMUSCULAR; INTRAVENOUS at 20:20

## 2017-11-13 RX ADMIN — Medication 10 ML: at 21:08

## 2017-11-13 NOTE — ED PROVIDER NOTES
HPI Comments: 76-year-old male was lifting a heavy piece of steel about 1 hour ago and developed pain under his left armpit that radiated to his epigastric region and chest.  He developed nausea, dizziness, and sweating with shortness of breath. Symptoms have been intermittent. He was given aspirin and nitroglycerin by EMS without change. Denies similar symptoms in the past.  Denies history of cardiac disease, high blood pressure, diabetes, or high cholesterol. Denies recent cough, congestion, fever. No pain with movement or deep breathing. No back pain. Also reports abdominal bloating. Has h/o perforated diverticulitis. Patient is a 67 y.o. male presenting with chest pain. The history is provided by the patient. Chest Pain (Angina)    Associated symptoms include abdominal pain, diaphoresis, nausea and shortness of breath. Pertinent negatives include no back pain, no cough, no fever, no headaches, no palpitations, no vomiting and no weakness. 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 Social History     Social History    Marital status:      Spouse name: N/A    Number of children: N/A    Years of education: N/A     Occupational History    Not on file.      Social History Main Topics    Smoking status: Former Smoker     Packs/day: 0.50     Years: 5.00     Quit date: 1/1/1970    Smokeless tobacco: Never Used    Alcohol use No    Drug use: No    Sexual activity: Not on file     Other Topics Concern    Not on file     Social History Narrative         ALLERGIES: Percocet [oxycodone-acetaminophen]    Review of Systems   Constitutional: Positive for chills and diaphoresis. Negative for fever. HENT: Negative for hearing loss. Eyes: Negative for visual disturbance. Respiratory: Positive for shortness of breath. Negative for cough. Cardiovascular: Positive for chest pain. Negative for palpitations and leg swelling. Gastrointestinal: Positive for abdominal pain and nausea. Negative for diarrhea and vomiting. Genitourinary: Negative for difficulty urinating. Musculoskeletal: Negative for back pain. Skin: Negative for rash. Neurological: Negative for weakness and headaches. Psychiatric/Behavioral: Negative for confusion. Vitals:    11/13/17 1822   BP: 135/63   Pulse: (!) 56   Resp: 16   Temp: 97.8 °F (36.6 °C)   SpO2: 96%   Weight: 72.6 kg (160 lb)   Height: 5' 11\" (1.803 m)            Physical Exam   Constitutional: He appears well-developed and well-nourished. Appears uncomfortable   HENT:   Head: Normocephalic and atraumatic. Right Ear: External ear normal.   Left Ear: External ear normal.   Nose: Nose normal.   Mouth/Throat: Oropharynx is clear and moist.   Eyes: Conjunctivae are normal. Pupils are equal, round, and reactive to light. Neck: Normal range of motion. Neck supple. Cardiovascular: Regular rhythm, normal heart sounds and intact distal pulses. Pulmonary/Chest: Effort normal and breath sounds normal. Tachypnea noted. No respiratory distress. He has no wheezes. Abdominal: Soft. He exhibits distension. Bowel sounds are increased. There is tenderness in the epigastric area. Musculoskeletal: Normal range of motion. He exhibits no edema or tenderness. Neurological: He is alert. Skin: Skin is warm and dry. Psychiatric: Judgment normal.   Nursing note and vitals reviewed. MDM  Number of Diagnoses or Management Options  Diagnosis management comments: Parts of this document were created using dragon voice recognition software. The chart has been reviewed but errors may still be present. 12:50 AM  Patient has had intermittent pain since arrival.  He had multiple episodes of vomiting, which is now well controlled. He had unremarkable labs with elevated d-dimer. He continued to complain of squeezing chest pain with repetitive vomiting, so CT dissection protocol was ordered. This showed gallstones without any other complications. Ultrasound did not show signs of cholecystitis or obstruction. He's had 2 negative troponins and 2 unremarkable EKGs. Patient feeling better and would like to go home. Given surgical follow-up. Advised to take Tylenol for pain and morphine as needed for severe breakthrough pain. Given diet instructions. I discussed the results of all labs, procedures, radiographs, and treatments with the patient and available family. Treatment plan is agreed upon and the patient is ready for discharge. Questions about treatment in the ED and differential diagnosis of presenting condition were answered. Patient was given verbal discharge instructions including, but not limited to, importance of returning to the emergency department for any concern of worsening or continued symptoms. Instructions were given to follow up with a primary care provider or specialist within 1-2 days. Adverse effects of medications, if prescribed, were discussed and patient was advised to refrain from significant physical activity until followed up by primary care physician and to not drive or operate heavy machinery after taking any sedating substances.            Amount and/or Complexity of Data Reviewed  Clinical lab tests: ordered and reviewed (Results for orders placed or performed during the hospital encounter of 11/13/17  -CBC WITH AUTOMATED DIFF       Result Value                         Ref Range                       WBC                                               9.1                           4.3 - 11.1 K/uL                 RBC                                               4.74                          4.23 - 5.67 M/uL                HGB                                               15.0                          13.6 - 17.2 g/dL                HCT                                               41.4                          41.1 - 50.3 %                   MCV                                               87.3                          79.6 - 97.8 FL                  MCH                                               31.6                          26.1 - 32.9 PG                  MCHC                                              36.2 (H)                      31.4 - 35.0 g/dL                RDW                                               12.5                          11.9 - 14.6 %                   PLATELET                                          206                           150 - 450 K/uL                  MPV                                               9.5 (L)                       10.8 - 14.1 FL                  DF                                                AUTOMATED                                                     NEUTROPHILS                                       63                            43 - 78 %                       LYMPHOCYTES                                       28                            13 - 44 %                       MONOCYTES                                         7                             4.0 - 12.0 %                    EOSINOPHILS                                       2                             0.5 - 7.8 %                     BASOPHILS                                         0                             0.0 - 2.0 %                     IMMATURE GRANULOCYTES                             0 0.0 - 5.0 %                     ABS. NEUTROPHILS                                  5.7                           1.7 - 8.2 K/UL                  ABS. LYMPHOCYTES                                  2.5                           0.5 - 4.6 K/UL                  ABS. MONOCYTES                                    0.7                           0.1 - 1.3 K/UL                  ABS. EOSINOPHILS                                  0.2                           0.0 - 0.8 K/UL                  ABS. BASOPHILS                                    0.0                           0.0 - 0.2 K/UL                  ABS. IMM.  GRANS.                                  0.0                           0.0 - 0.5 K/UL             -METABOLIC PANEL, COMPREHENSIVE       Result                                            Value                         Ref Range                       Sodium                                            144                           136 - 145 mmol/L                Potassium                                         3.5                           3.5 - 5.1 mmol/L                Chloride                                          108 (H)                       98 - 107 mmol/L                 CO2                                               21                            21 - 32 mmol/L                  Anion gap                                         15                            7 - 16 mmol/L                   Glucose                                           130 (H)                       65 - 100 mg/dL                  BUN                                               23                            8 - 23 MG/DL                    Creatinine                                        1.17                          0.8 - 1.5 MG/DL                 GFR est AA                                        >60                           >60 ml/min/1.73m2               GFR est non-AA                                    >60 >60 ml/min/1.73m2               Calcium                                           8.7                           8.3 - 10.4 MG/DL                Bilirubin, total                                  0.4                           0.2 - 1.1 MG/DL                 ALT (SGPT)                                        24                            12 - 65 U/L                     AST (SGOT)                                        24                            15 - 37 U/L                     Alk. phosphatase                                  80                            50 - 136 U/L                    Protein, total                                    6.7                           6.3 - 8.2 g/dL                  Albumin                                           3.4                           3.2 - 4.6 g/dL                  Globulin                                          3.3                           2.3 - 3.5 g/dL                  A-G Ratio                                         1.0 (L)                       1.2 - 3.5                  -LIPASE       Result                                            Value                         Ref Range                       Lipase                                            228                           73 - 393 U/L               -D DIMER       Result                                            Value                         Ref Range                       D DIMER                                           1.25 (HH)                     <0.55 ug/ml(FEU)           -POC TROPONIN-I       Result                                            Value                         Ref Range                       Troponin-I (POC)                                  0 (L)                         0.02 - 0.05 ng/ml          -POC TROPONIN-I       Result                                            Value                         Ref Range                       Troponin-I (POC)                                  0 (L) 0.02 - 0.05 ng/ml          -EKG, 12 LEAD, INITIAL       Result                                            Value                         Ref Range                       Ventricular Rate                                  50                            BPM                             Atrial Rate                                       52                            BPM                             QRS Duration                                      88                            ms                              Q-T Interval                                      458                           ms                              QTC Calculation (Bezet)                           417                           ms                              Calculated P Axis                                 59                            degrees                         Calculated R Axis                                 -45                           degrees                         Calculated T Axis                                 16                            degrees                         Diagnosis                                                                                                      Sinus bradycardia   Left anterior fascicular block   Minimal voltage criteria for LVH, may be normal variant   Abnormal ECG   When compared with ECG of 04-NOV-2012 17:46,   Vent.  rate has decreased BY  38 BPM   Confirmed by David Arroyo ([id number]) on 11/13/2017 11:08:36 PM     -EKG, 12 LEAD, SUBSEQUENT       Result                                            Value                         Ref Range                       Ventricular Rate                                  52                            BPM                             Atrial Rate                                       52                            BPM                             P-R Interval                                      168                           ms                              QRS Duration                                      104                           ms                              Q-T Interval                                      450                           ms                              QTC Calculation (Bezet)                           418                           ms                              Calculated P Axis                                 59                            degrees                         Calculated R Axis                                 -44                           degrees                         Calculated T Axis                                 18                            degrees                         Diagnosis                                                                                                      Sinus bradycardia   Left axis deviation   Abnormal ECG   When compared with ECG of 13-NOV-2017 18:31,   No significant change was found   Confirmed by Ziggy Ball ([id number]) on 11/13/2017 11:11:42 PM     )  Tests in the radiology section of [facility]®: ordered and reviewed (Xr Chest Pa Lat    Result Date: 11/13/2017  Chest X-ray INDICATION:  Chest pain PA and lateral views of the chest were obtained. FINDINGS: The lungs are clear. There are no infiltrates or effusions. The heart size is normal.  The bony thorax is intact. IMPRESSION: No acute findings in the chest     Cta Chest Abd W Wo Cont    Result Date: 11/13/2017  CTA of the Chest, Abdomen INDICATION:  Severe chest pain Multiple axial images were obtained through the chest, abdomen after intravenous injection of 125cc of optiray 350. Reformatted images were also evaluated. Radiation dose reduction techniques were used for this study: All CT scans performed at this facility use one or all of the following: Automated exposure control, adjustment of the mA and/or kVp according to patient's size, iterative reconstruction. FINDINGS: Chest CTA: There is normal enhancement of the thoracic aorta.   There is no evidence of dissection or aneurysm. There is mild atherosclerosis in the coronary arteries. Normal enhancement of the pulmonary arteries. There is no evidence of pulmonary embolus. The lungs are clear. No masses or infiltrates are seen. There is no effusion. There is no significant adenopathy. There are no bony lesions. Abdomen CTA:  There is normal enhancement of the abdominal aorta. There are no significant lesions in the liver or spleen. There several gallstones. The gallbladder is slightly distended. The adrenal glands and pancreas appear normal.  There is a 2.6 cm left renal cyst.  There is no hydronephrosis. There is no adenopathy. There is sigmoid diverticulosis. There are no inflammatory changes or fluid collections in the abdomen. IMPRESSION:  1. No evidence of aortic aneurysm or dissection. 2.  No evidence of pulmonary embolus. 3.  Gallstones and gallbladder distention. Suggest ultrasound evaluation there is clinical concern for cholecystitis 4. Mild sigmoid diverticulosis. Us Abd Ltd    Result Date: 11/14/2017  RIGHT UPPER QUADRANT ULTRASOUND. HISTORY: Right upper quadrant abdominal pain. COMPARISON: CT abdomen pelvis dated 11/13/2017 FINDINGS: The ultrasonographic Carmona's sign is reported as  negative solitary large gallstone. The gallbladder wall is not thickened. The common bile duct is not dilated, 6.7 mm. Intrahepatic biliary tree is not dilated. Included portion of the pancreas and right kidney are unremarkable.  The liver is unremarkable     IMPRESSION: Solitary large gallstone.     )  Tests in the medicine section of [facility]®: ordered and reviewed      ED Course       Procedures

## 2017-11-13 NOTE — ED TRIAGE NOTES
Pt arrived via EMS with c/o chest pain that started an hour ago. Pt received 324 asa and SL nitro X4 without relief. BGL = 110. Denies any cardiac history.  Pain started under L arm and radiated across chest.

## 2017-11-14 VITALS
TEMPERATURE: 97.8 F | BODY MASS INDEX: 22.4 KG/M2 | WEIGHT: 160 LBS | RESPIRATION RATE: 16 BRPM | HEIGHT: 71 IN | OXYGEN SATURATION: 99 % | DIASTOLIC BLOOD PRESSURE: 74 MMHG | HEART RATE: 56 BPM | SYSTOLIC BLOOD PRESSURE: 178 MMHG

## 2017-11-14 RX ORDER — ONDANSETRON 4 MG/1
4 TABLET, ORALLY DISINTEGRATING ORAL
Qty: 20 TAB | Refills: 0 | Status: SHIPPED | OUTPATIENT
Start: 2017-11-14 | End: 2017-11-17 | Stop reason: CLARIF

## 2017-11-14 RX ORDER — MORPHINE SULFATE 15 MG/1
15 TABLET ORAL
Qty: 8 TAB | Refills: 0 | Status: SHIPPED | OUTPATIENT
Start: 2017-11-14 | End: 2017-11-17 | Stop reason: CLARIF

## 2017-11-14 NOTE — ED NOTES
I have reviewed discharge instructions with the patient. The patient verbalized understanding. Patient left ED via Discharge Method: ambulatory to Home with with wife and sons driving. Opportunity for questions and clarification provided. Patient given 2 scripts. Phone number and address for surgical associates provided for patient. The patient is in no acute distress at this time. To continue your aftercare when you leave the hospital, you may receive an automated call from our care team to check in on how you are doing. This is a free service and part of our promise to provide the best care and service to meet your aftercare needs.  If you have questions, or wish to unsubscribe from this service please call [phone]. Thank you for Choosing our [facility] Emergency Department.

## 2017-11-14 NOTE — DISCHARGE INSTRUCTIONS
Chest Pain: Care Instructions  Your Care Instructions    There are many things that can cause chest pain. Some are not serious and will get better on their own in a few days. But some kinds of chest pain need more testing and treatment. Your doctor may have recommended a follow-up visit in the next 8 to 12 hours. If you are not getting better, you may need more tests or treatment. Even though your doctor has released you, you still need to watch for any problems. The doctor carefully checked you, but sometimes problems can develop later. If you have new symptoms or if your symptoms do not get better, get medical care right away. If you have worse or different chest pain or pressure that lasts more than 5 minutes or you passed out (lost consciousness), call 911 or seek other emergency help right away. A medical visit is only one step in your treatment. Even if you feel better, you still need to do what your doctor recommends, such as going to all suggested follow-up appointments and taking medicines exactly as directed. This will help you recover and help prevent future problems. How can you care for yourself at home? · Rest until you feel better. · Take your medicine exactly as prescribed. Call your doctor if you think you are having a problem with your medicine. · Do not drive after taking a prescription pain medicine. When should you call for help? Call 911 if:  ? · You passed out (lost consciousness). ? · You have severe difficulty breathing. ? · You have symptoms of a heart attack. These may include:  ¨ Chest pain or pressure, or a strange feeling in your chest.  ¨ Sweating. ¨ Shortness of breath. ¨ Nausea or vomiting. ¨ Pain, pressure, or a strange feeling in your back, neck, jaw, or upper belly or in one or both shoulders or arms. ¨ Lightheadedness or sudden weakness. ¨ A fast or irregular heartbeat.   After you call 911, the  may tell you to chew 1 adult-strength or 2 to 4 low-dose aspirin. Wait for an ambulance. Do not try to drive yourself. ?Call your doctor today if:  ? · You have any trouble breathing. ? · Your chest pain gets worse. ? · You are dizzy or lightheaded, or you feel like you may faint. ? · You are not getting better as expected. ? · You are having new or different chest pain. Where can you learn more? Go to http://antonio-radha.info/. Enter A120 in the search box to learn more about \"Chest Pain: Care Instructions. \"  Current as of: March 20, 2017  Content Version: 11.4  © 7080-0996 [employer]. Care instructions adapted under license by [employer] (which disclaims liability or warranty for this information). If you have questions about a medical condition or this instruction, always ask your healthcare professional. [address] any warranty or liability for your use of this information. Low-Fat Diet for Gallbladder Disease: Care Instructions  Your Care Instructions    When you eat, the gallbladder releases bile, which helps you digest the fat in food. If you have an inflamed gallbladder, this may cause pain. A low-fat diet may give your gallbladder a rest so you can start to heal. Your doctor and dietitian can help you make an eating plan that does not irritate your digestive system. Always talk with your doctor or dietitian before you make changes in your diet. Follow-up care is a key part of your treatment and safety. Be sure to make and go to all appointments, and call your doctor if you are having problems. It's also a good idea to know your test results and keep a list of the medicines you take. How can you care for yourself at home? · Eat many small meals and snacks each day instead of three large meals. · Choose lean meats. ¨ Eat no more than 5 to 6½ ounces of meat a day. ¨ Cut off all fat you can see. ¨ Eat chicken and turkey without the skin.   ¨ Many types of fish, such as salmon, lake trout, tuna, and herring, provide healthy omega-3 fat. But, avoid fish canned in oil, such as sardines in olive oil. ¨ Bake, broil, or grill meats, poultry, or fish instead of frying them in butter or fat. · Drink or eat nonfat or low-fat milk, yogurt, cheese, or other milk products each day. ¨ Read the labels on cheeses, and choose those with less than 5 grams of fat an ounce. ¨ Try fat-free sour cream, cream cheese, or yogurt. ¨ Avoid cream soups and cream sauces on pasta. ¨ Eat low-fat ice cream, frozen yogurt, or sorbet. Avoid regular ice cream.  · Eat whole-grain cereals, breads, crackers, rice, or pasta. Avoid high-fat foods such as croissants, scones, biscuits, waffles, doughnuts, muffins, granola, and high-fat breads. · Flavor your foods with herbs and spices (such as basil, tarragon, or mint), fat-free sauces, or lemon juice instead of butter. You can also use butter substitutes, fat-free mayonnaise, or fat-free dressing. · Try applesauce, prune puree, or mashed bananas to replace some or all of the fat when you bake. · Limit fats and oils, such as butter, margarine, mayonnaise, and salad dressing, to no more than 1 tablespoon a meal.  · Avoid high-fat foods, such as:  ¨ Chocolate, whole milk, ice cream, and processed cheese. ¨ Fried or buttered foods. ¨ Sausage, salami, and cerrato. ¨ Cinnamon rolls, cakes, pies, cookies, and other pastries. ¨ Prepared snack foods, such as potato chips, nut and granola bars, and mixed nuts. ¨ Coconut and avocado. · Learn how to read food labels for serving sizes and ingredients. Fast-food and convenience-food meals often have lots of fat. Where can you learn more? Go to http://antonio-radha.info/. Enter J976 in the search box to learn more about \"Low-Fat Diet for Gallbladder Disease: Care Instructions. \"  Current as of: May 12, 2017  Content Version: 11.4  © 6559-9550 [employer].  Care instructions adapted under license by [employer] (which disclaims liability or warranty for this information). If you have questions about a medical condition or this instruction, always ask your healthcare professional. [address] any warranty or liability for your use of this information.

## 2017-11-17 ENCOUNTER — HOSPITAL ENCOUNTER (OUTPATIENT)
Dept: SURGERY | Age: 72
Discharge: HOME | End: 2017-11-17
Payer: MEDICARE

## 2017-11-17 VITALS
DIASTOLIC BLOOD PRESSURE: 84 MMHG | RESPIRATION RATE: 16 BRPM | SYSTOLIC BLOOD PRESSURE: 139 MMHG | HEART RATE: 68 BPM | OXYGEN SATURATION: 98 % | TEMPERATURE: 97.6 F | BODY MASS INDEX: 21.98 KG/M2 | HEIGHT: 71 IN | WEIGHT: 157 LBS

## 2017-11-17 LAB
ALBUMIN SERPL-MCNC: 3.3 G/DL (ref 3.2–4.6)
ALBUMIN/GLOB SERPL: 0.8 {RATIO} (ref 1.2–3.5)
ALP SERPL-CCNC: 112 U/L (ref 50–136)
ALT SERPL-CCNC: 23 U/L (ref 12–65)
ANION GAP SERPL CALC-SCNC: 6 MMOL/L (ref 7–16)
APTT PPP: 32.5 SEC (ref 23.5–31.7)
AST SERPL-CCNC: 19 U/L (ref 15–37)
BILIRUB SERPL-MCNC: 0.8 MG/DL (ref 0.2–1.1)
BUN SERPL-MCNC: 21 MG/DL (ref 8–23)
CALCIUM SERPL-MCNC: 9 MG/DL (ref 8.3–10.4)
CHLORIDE SERPL-SCNC: 103 MMOL/L (ref 98–107)
CO2 SERPL-SCNC: 31 MMOL/L (ref 21–32)
CREAT SERPL-MCNC: 1.07 MG/DL (ref 0.8–1.5)
GLOBULIN SER CALC-MCNC: 3.9 G/DL (ref 2.3–3.5)
GLUCOSE SERPL-MCNC: 97 MG/DL (ref 65–100)
INR PPP: 1 (ref 0.9–1.2)
POTASSIUM SERPL-SCNC: 4.2 MMOL/L (ref 3.5–5.1)
PROT SERPL-MCNC: 7.2 G/DL (ref 6.3–8.2)
PROTHROMBIN TIME: 11 SEC (ref 9.6–12)
SODIUM SERPL-SCNC: 140 MMOL/L (ref 136–145)

## 2017-11-17 PROCEDURE — 80053 COMPREHEN METABOLIC PANEL: CPT | Performed by: SURGERY

## 2017-11-17 PROCEDURE — 85730 THROMBOPLASTIN TIME PARTIAL: CPT | Performed by: SURGERY

## 2017-11-17 PROCEDURE — 85610 PROTHROMBIN TIME: CPT | Performed by: SURGERY

## 2017-11-17 RX ORDER — ACETAMINOPHEN 325 MG/1
500 TABLET ORAL
COMMUNITY
End: 2017-11-22

## 2017-11-17 NOTE — PERIOP NOTES
Recent Results (from the past 12 hour(s))   PTT    Collection Time: 11/17/17 11:03 AM   Result Value Ref Range    aPTT 32.5 (H) 23.5 - 31.7 SEC   PROTHROMBIN TIME + INR    Collection Time: 11/17/17 11:03 AM   Result Value Ref Range    Prothrombin time 11.0 9.6 - 12.0 sec    INR 1.0 0.9 - 1.2     METABOLIC PANEL, COMPREHENSIVE    Collection Time: 11/17/17 11:03 AM   Result Value Ref Range    Sodium 140 136 - 145 mmol/L    Potassium 4.2 3.5 - 5.1 mmol/L    Chloride 103 98 - 107 mmol/L    CO2 31 21 - 32 mmol/L    Anion gap 6 (L) 7 - 16 mmol/L    Glucose 97 65 - 100 mg/dL    BUN 21 8 - 23 MG/DL    Creatinine 1.07 0.8 - 1.5 MG/DL    GFR est AA >60 >60 ml/min/1.73m2    GFR est non-AA >60 >60 ml/min/1.73m2    Calcium 9.0 8.3 - 10.4 MG/DL    Bilirubin, total 0.8 0.2 - 1.1 MG/DL    ALT (SGPT) 23 12 - 65 U/L    AST (SGOT) 19 15 - 37 U/L    Alk.  phosphatase 112 50 - 136 U/L    Protein, total 7.2 6.3 - 8.2 g/dL    Albumin 3.3 3.2 - 4.6 g/dL    Globulin 3.9 (H) 2.3 - 3.5 g/dL    A-G Ratio 0.8 (L) 1.2 - 3.5

## 2017-11-17 NOTE — PERIOP NOTES
Patient verified name, , and surgery as listed in [facility]. Patient provided medical/health information and PTA medications to the best of their ability. TYPE  CASE:ll  Orders per surgeon: Received were  Labs per surgeon Called Dr Tamara Rodriguez and told him the patient had chest x ray and ekg and cbc and cmp on 2017 and he ask to have cmp and pt and ptt done today, orders done  Labs per anesthesia protocol: no additional.    EKG  :  Ekg on chart from 2017, patient denies any hx of chest pain, ELY or CAD    Patient provided with and instructed on education handouts including Guide to Surgery, blood transfusions, pain management, and hand hygiene for the family and community, and [facility] brochure. Antibacterial soap and instructions given per hospital policy. Instructed patient to continue previous medications as prescribed prior to surgery unless otherwise directed and to take the following medications the day of surgery according to anesthesia guidelines : none . Instructed patient to hold  the following medications: none, patient takes his medications at . Original medication prescription bottles were not visualized during patient appointment. Patient teach back successful and patient demonstrates knowledge of instruction.

## 2017-11-18 PROBLEM — K80.20 CALCULUS OF GALLBLADDER WITHOUT CHOLECYSTITIS WITHOUT OBSTRUCTION: Status: ACTIVE | Noted: 2017-11-18

## 2017-11-22 ENCOUNTER — HOSPITAL ENCOUNTER (OUTPATIENT)
Age: 72
Setting detail: OUTPATIENT SURGERY
Discharge: HOME | End: 2017-11-22
Attending: SURGERY | Admitting: SURGERY
Payer: MEDICARE

## 2017-11-22 ENCOUNTER — ANESTHESIA EVENT (OUTPATIENT)
Dept: SURGERY | Age: 72
End: 2017-11-22
Payer: MEDICARE

## 2017-11-22 ENCOUNTER — ANESTHESIA (OUTPATIENT)
Dept: SURGERY | Age: 72
End: 2017-11-22
Payer: MEDICARE

## 2017-11-22 VITALS
BODY MASS INDEX: 22.05 KG/M2 | OXYGEN SATURATION: 94 % | DIASTOLIC BLOOD PRESSURE: 74 MMHG | HEIGHT: 71 IN | WEIGHT: 157.5 LBS | HEART RATE: 72 BPM | TEMPERATURE: 97.5 F | RESPIRATION RATE: 18 BRPM | SYSTOLIC BLOOD PRESSURE: 172 MMHG

## 2017-11-22 PROCEDURE — 76210000016 HC OR PH I REC 1 TO 1.5 HR: Performed by: SURGERY

## 2017-11-22 PROCEDURE — 74011250637 HC RX REV CODE- 250/637: Performed by: ANESTHESIOLOGY

## 2017-11-22 PROCEDURE — 77030020782 HC GWN BAIR PAWS FLX 3M -B: Performed by: ANESTHESIOLOGY

## 2017-11-22 PROCEDURE — 77030008477 HC STYL SATN SLP COVD -A: Performed by: ANESTHESIOLOGY

## 2017-11-22 PROCEDURE — 76210000021 HC REC RM PH II 0.5 TO 1 HR: Performed by: SURGERY

## 2017-11-22 PROCEDURE — 77030000038 HC TIP SCIS LAPSCP SURI -B: Performed by: SURGERY

## 2017-11-22 PROCEDURE — 74011000250 HC RX REV CODE- 250: Performed by: SURGERY

## 2017-11-22 PROCEDURE — 77030022474 HC RELD STPLR ENDO GIA COVD -C: Performed by: SURGERY

## 2017-11-22 PROCEDURE — 76060000034 HC ANESTHESIA 1.5 TO 2 HR: Performed by: SURGERY

## 2017-11-22 PROCEDURE — 77030016151 HC PROTCTR LNS DFOG COVD -B: Performed by: SURGERY

## 2017-11-22 PROCEDURE — 77030035220 HC TRCR ENDOSC BLNTPRT ANCHR COVD -B: Performed by: SURGERY

## 2017-11-22 PROCEDURE — 74011250636 HC RX REV CODE- 250/636

## 2017-11-22 PROCEDURE — 77030035045 HC TRCR ENDOSC VRSPRT BLDLSS COVD -B: Performed by: SURGERY

## 2017-11-22 PROCEDURE — 77030008518 HC TBNG INSUF ENDO STRY -B: Performed by: SURGERY

## 2017-11-22 PROCEDURE — 77030009403 HC ELECTRD ENDO MEGA -B: Performed by: SURGERY

## 2017-11-22 PROCEDURE — 74011000250 HC RX REV CODE- 250

## 2017-11-22 PROCEDURE — 77030008756 HC TU IRR SUC STRY -B: Performed by: SURGERY

## 2017-11-22 PROCEDURE — 77030011640 HC PAD GRND REM COVD -A: Performed by: SURGERY

## 2017-11-22 PROCEDURE — 77030035051: Performed by: SURGERY

## 2017-11-22 PROCEDURE — 77030009851 HC PCH RTVR ENDOSC AMR -B: Performed by: SURGERY

## 2017-11-22 PROCEDURE — 74011250636 HC RX REV CODE- 250/636: Performed by: ANESTHESIOLOGY

## 2017-11-22 PROCEDURE — 77030032490 HC SLV COMPR SCD KNE COVD -B: Performed by: SURGERY

## 2017-11-22 PROCEDURE — 77030018836 HC SOL IRR NACL ICUM -A: Performed by: SURGERY

## 2017-11-22 PROCEDURE — 76010000162 HC OR TIME 1.5 TO 2 HR INTENSV-TIER 1: Performed by: SURGERY

## 2017-11-22 PROCEDURE — 77030035048 HC TRCR ENDOSC OPTCL COVD -B: Performed by: SURGERY

## 2017-11-22 PROCEDURE — 77030008703 HC TU ET UNCUF COVD -A: Performed by: ANESTHESIOLOGY

## 2017-11-22 PROCEDURE — 77030037892: Performed by: SURGERY

## 2017-11-22 PROCEDURE — 74011250636 HC RX REV CODE- 250/636: Performed by: SURGERY

## 2017-11-22 PROCEDURE — 74011000258 HC RX REV CODE- 258: Performed by: SURGERY

## 2017-11-22 PROCEDURE — 77030031139 HC SUT VCRL2 J&J -A: Performed by: SURGERY

## 2017-11-22 PROCEDURE — 77030030537 HC STPLR ENDO GIA COVD -C: Performed by: SURGERY

## 2017-11-22 PROCEDURE — 77030035029 HC NDL INSUF VERES DISP COVD -B: Performed by: SURGERY

## 2017-11-22 PROCEDURE — 88304 TISSUE EXAM BY PATHOLOGIST: CPT | Performed by: SURGERY

## 2017-11-22 PROCEDURE — 77030012022 HC APPL CLP ENDOSC COVD -C: Performed by: SURGERY

## 2017-11-22 PROCEDURE — 77030019908 HC STETH ESOPH SIMS -A: Performed by: ANESTHESIOLOGY

## 2017-11-22 RX ORDER — OXYCODONE HYDROCHLORIDE 5 MG/1
10 TABLET ORAL
Status: DISCONTINUED | OUTPATIENT
Start: 2017-11-22 | End: 2017-11-22 | Stop reason: HOSPADM

## 2017-11-22 RX ORDER — ROCURONIUM BROMIDE 10 MG/ML
INJECTION, SOLUTION INTRAVENOUS AS NEEDED
Status: DISCONTINUED | OUTPATIENT
Start: 2017-11-22 | End: 2017-11-22 | Stop reason: HOSPADM

## 2017-11-22 RX ORDER — NEOSTIGMINE METHYLSULFATE 1 MG/ML
INJECTION INTRAVENOUS AS NEEDED
Status: DISCONTINUED | OUTPATIENT
Start: 2017-11-22 | End: 2017-11-22 | Stop reason: HOSPADM

## 2017-11-22 RX ORDER — SODIUM CHLORIDE, SODIUM LACTATE, POTASSIUM CHLORIDE, CALCIUM CHLORIDE 600; 310; 30; 20 MG/100ML; MG/100ML; MG/100ML; MG/100ML
100 INJECTION, SOLUTION INTRAVENOUS CONTINUOUS
Status: DISCONTINUED | OUTPATIENT
Start: 2017-11-22 | End: 2017-11-22 | Stop reason: HOSPADM

## 2017-11-22 RX ORDER — LIDOCAINE HYDROCHLORIDE 20 MG/ML
INJECTION, SOLUTION EPIDURAL; INFILTRATION; INTRACAUDAL; PERINEURAL AS NEEDED
Status: DISCONTINUED | OUTPATIENT
Start: 2017-11-22 | End: 2017-11-22 | Stop reason: HOSPADM

## 2017-11-22 RX ORDER — FENTANYL CITRATE 50 UG/ML
INJECTION, SOLUTION INTRAMUSCULAR; INTRAVENOUS AS NEEDED
Status: DISCONTINUED | OUTPATIENT
Start: 2017-11-22 | End: 2017-11-22 | Stop reason: HOSPADM

## 2017-11-22 RX ORDER — HYDROMORPHONE HYDROCHLORIDE 2 MG/ML
0.5 INJECTION, SOLUTION INTRAMUSCULAR; INTRAVENOUS; SUBCUTANEOUS
Status: DISCONTINUED | OUTPATIENT
Start: 2017-11-22 | End: 2017-11-22 | Stop reason: HOSPADM

## 2017-11-22 RX ORDER — HYDROCODONE BITARTRATE AND ACETAMINOPHEN 5; 325 MG/1; MG/1
TABLET ORAL
Qty: 30 TAB | Refills: 0 | Status: SHIPPED | OUTPATIENT
Start: 2017-11-22 | End: 2018-07-20

## 2017-11-22 RX ORDER — BUPIVACAINE HYDROCHLORIDE 5 MG/ML
INJECTION, SOLUTION EPIDURAL; INTRACAUDAL AS NEEDED
Status: DISCONTINUED | OUTPATIENT
Start: 2017-11-22 | End: 2017-11-22 | Stop reason: HOSPADM

## 2017-11-22 RX ORDER — DEXAMETHASONE SODIUM PHOSPHATE 4 MG/ML
INJECTION, SOLUTION INTRA-ARTICULAR; INTRALESIONAL; INTRAMUSCULAR; INTRAVENOUS; SOFT TISSUE AS NEEDED
Status: DISCONTINUED | OUTPATIENT
Start: 2017-11-22 | End: 2017-11-22 | Stop reason: HOSPADM

## 2017-11-22 RX ORDER — MIDAZOLAM HYDROCHLORIDE 1 MG/ML
2 INJECTION, SOLUTION INTRAMUSCULAR; INTRAVENOUS
Status: DISCONTINUED | OUTPATIENT
Start: 2017-11-22 | End: 2017-11-22 | Stop reason: HOSPADM

## 2017-11-22 RX ORDER — SODIUM CHLORIDE 0.9 % (FLUSH) 0.9 %
5-10 SYRINGE (ML) INJECTION EVERY 8 HOURS
Status: DISCONTINUED | OUTPATIENT
Start: 2017-11-22 | End: 2017-11-22 | Stop reason: HOSPADM

## 2017-11-22 RX ORDER — PROPOFOL 10 MG/ML
INJECTION, EMULSION INTRAVENOUS AS NEEDED
Status: DISCONTINUED | OUTPATIENT
Start: 2017-11-22 | End: 2017-11-22 | Stop reason: HOSPADM

## 2017-11-22 RX ORDER — GLYCOPYRROLATE 0.2 MG/ML
INJECTION INTRAMUSCULAR; INTRAVENOUS AS NEEDED
Status: DISCONTINUED | OUTPATIENT
Start: 2017-11-22 | End: 2017-11-22 | Stop reason: HOSPADM

## 2017-11-22 RX ORDER — ONDANSETRON 2 MG/ML
INJECTION INTRAMUSCULAR; INTRAVENOUS AS NEEDED
Status: DISCONTINUED | OUTPATIENT
Start: 2017-11-22 | End: 2017-11-22 | Stop reason: HOSPADM

## 2017-11-22 RX ORDER — SODIUM CHLORIDE 0.9 % (FLUSH) 0.9 %
5-10 SYRINGE (ML) INJECTION AS NEEDED
Status: DISCONTINUED | OUTPATIENT
Start: 2017-11-22 | End: 2017-11-22 | Stop reason: HOSPADM

## 2017-11-22 RX ORDER — LIDOCAINE HYDROCHLORIDE 10 MG/ML
0.3 INJECTION INFILTRATION; PERINEURAL ONCE
Status: DISCONTINUED | OUTPATIENT
Start: 2017-11-22 | End: 2017-11-22 | Stop reason: HOSPADM

## 2017-11-22 RX ORDER — ACETAMINOPHEN 500 MG
1000 TABLET ORAL ONCE
Status: COMPLETED | OUTPATIENT
Start: 2017-11-22 | End: 2017-11-22

## 2017-11-22 RX ORDER — KETOROLAC TROMETHAMINE 30 MG/ML
30 INJECTION, SOLUTION INTRAMUSCULAR; INTRAVENOUS
Status: COMPLETED | OUTPATIENT
Start: 2017-11-22 | End: 2017-11-22

## 2017-11-22 RX ADMIN — SODIUM CHLORIDE, SODIUM LACTATE, POTASSIUM CHLORIDE, AND CALCIUM CHLORIDE: 600; 310; 30; 20 INJECTION, SOLUTION INTRAVENOUS at 12:53

## 2017-11-22 RX ADMIN — FENTANYL CITRATE 100 MCG: 50 INJECTION, SOLUTION INTRAMUSCULAR; INTRAVENOUS at 11:38

## 2017-11-22 RX ADMIN — NEOSTIGMINE METHYLSULFATE 3 MG: 1 INJECTION INTRAVENOUS at 12:50

## 2017-11-22 RX ADMIN — DEXAMETHASONE SODIUM PHOSPHATE 4 MG: 4 INJECTION, SOLUTION INTRA-ARTICULAR; INTRALESIONAL; INTRAMUSCULAR; INTRAVENOUS; SOFT TISSUE at 12:01

## 2017-11-22 RX ADMIN — ACETAMINOPHEN 1000 MG: 500 TABLET, FILM COATED ORAL at 10:35

## 2017-11-22 RX ADMIN — HYDROMORPHONE HYDROCHLORIDE 0.5 MG: 2 INJECTION, SOLUTION INTRAMUSCULAR; INTRAVENOUS; SUBCUTANEOUS at 13:20

## 2017-11-22 RX ADMIN — KETOROLAC TROMETHAMINE 30 MG: 30 INJECTION, SOLUTION INTRAMUSCULAR at 13:57

## 2017-11-22 RX ADMIN — GLYCOPYRROLATE 0.4 MG: 0.2 INJECTION INTRAMUSCULAR; INTRAVENOUS at 12:50

## 2017-11-22 RX ADMIN — FENTANYL CITRATE 50 MCG: 50 INJECTION, SOLUTION INTRAMUSCULAR; INTRAVENOUS at 12:11

## 2017-11-22 RX ADMIN — ROCURONIUM BROMIDE 35 MG: 10 INJECTION, SOLUTION INTRAVENOUS at 11:38

## 2017-11-22 RX ADMIN — SODIUM CHLORIDE, SODIUM LACTATE, POTASSIUM CHLORIDE, AND CALCIUM CHLORIDE 100 ML/HR: 600; 310; 30; 20 INJECTION, SOLUTION INTRAVENOUS at 10:34

## 2017-11-22 RX ADMIN — FENTANYL CITRATE 50 MCG: 50 INJECTION, SOLUTION INTRAMUSCULAR; INTRAVENOUS at 12:19

## 2017-11-22 RX ADMIN — HYDROMORPHONE HYDROCHLORIDE 0.5 MG: 2 INJECTION, SOLUTION INTRAMUSCULAR; INTRAVENOUS; SUBCUTANEOUS at 13:28

## 2017-11-22 RX ADMIN — HYDROMORPHONE HYDROCHLORIDE 0.5 MG: 2 INJECTION, SOLUTION INTRAMUSCULAR; INTRAVENOUS; SUBCUTANEOUS at 13:35

## 2017-11-22 RX ADMIN — LIDOCAINE HYDROCHLORIDE 100 MG: 20 INJECTION, SOLUTION EPIDURAL; INFILTRATION; INTRACAUDAL; PERINEURAL at 11:38

## 2017-11-22 RX ADMIN — ONDANSETRON 4 MG: 2 INJECTION INTRAMUSCULAR; INTRAVENOUS at 12:01

## 2017-11-22 RX ADMIN — PROPOFOL 200 MG: 10 INJECTION, EMULSION INTRAVENOUS at 11:38

## 2017-11-22 RX ADMIN — CEFOXITIN 2 G: 2 INJECTION, POWDER, FOR SOLUTION INTRAVENOUS at 11:31

## 2017-11-22 NOTE — ANESTHESIA PREPROCEDURE EVALUATION
Anesthetic History               Review of Systems / Medical History  Patient summary reviewed and pertinent labs reviewed    Pulmonary  Within defined limits              Comments: Seasonal allergies   Neuro/Psych   Within defined limits           Cardiovascular                  Exercise tolerance: >4 METS     GI/Hepatic/Renal                Endo/Other             Other Findings              Physical Exam    Airway  Mallampati: I  TM Distance: 4 - 6 cm  Neck ROM: normal range of motion   Mouth opening: Normal     Cardiovascular    Rhythm: regular  Rate: normal         Dental    Dentition: Caps/crowns     Pulmonary  Breath sounds clear to auscultation               Abdominal         Other Findings            Anesthetic Plan    ASA: 2  Anesthesia type: general          Induction: Intravenous  Anesthetic plan and risks discussed with: Patient

## 2017-11-22 NOTE — DISCHARGE INSTRUCTIONS
Remove plastic dressing and gauze after 48 hours, leave sterile strips on for 7-10 days, OK to shower    Cholecystectomy: What to Expect at [address]  After your surgery, it is normal to feel weak and tired for several days after you return home. Your belly may be swollen. Since you had laparoscopic surgery, you may also have pain in your shoulder for about 24 hours. You may have gas or need to burp a lot at first, and a few people get diarrhea. The diarrhea usually goes away in 2 to 4 weeks, but it may last longer. For a laparoscopic surgery, most people can go back to work or their normal routine in 1 to 2 weeks, but it may take longer, depending on the type of work you do. This care sheet gives you a general idea about how long it will take for you to recover. However, each person recovers at a different pace. Follow the steps below to get better as quickly as possible. How can you care for yourself at home? Activity  · Rest when you feel tired. Getting enough sleep will help you recover. · Try to walk each day. Start out by walking a little more than you did the day before. Gradually increase the amount you walk. Walking boosts blood flow and helps prevent pneumonia and constipation. · For about 2 to 4 weeks, avoid lifting anything that would make you strain or anything over 10 pounds. · Avoid strenuous activities, such as biking, jogging, weightlifting, and aerobic exercise, until your doctor says it is okay. · You may shower 24 hours after surgery, if your doctor okays it. Pat the cut (incision) dry. Do not take a bath until your doctor tells you it is okay. · You may drive when you are no longer taking pain medicine and can quickly move your foot from the gas pedal to the brake. You must also be able to sit comfortably for a long period of time, even if you do not plan to go far. You might get caught in traffic. · Your doctor will tell you when you can have sex again.   Diet  · Eat smaller meals more often instead of fewer larger meals. You can eat a normal diet, but avoid eating fatty foods for about 1 month. Fatty foods include hamburger, whole milk, cheese, and many snack foods. If your stomach is upset, try bland, low-fat foods like plain rice, broiled chicken, toast, and yogurt. · Drink plenty of fluids (unless your doctor tells you not to). · If you have diarrhea, try avoiding spicy foods, dairy products, fatty foods, and alcohol. You can also watch to see if specific foods cause it, and stop eating them. If the diarrhea continues for more than 2 weeks, talk to your doctor. · You may notice that your bowel movements are not regular right after your surgery. This is common. Try to avoid constipation and straining with bowel movements. You may want to take a fiber supplement every day. If you have not had a bowel movement after a couple of days, ask your doctor about taking a mild laxative. Medicines  · Take pain medicines exactly as directed. ¨ If the doctor gave you a prescription medicine for pain, take it as prescribed. ¨ If you are not taking a prescription pain medicine, take an over-the-counter medicine such as acetaminophen (Tylenol), ibuprofen (Advil, Motrin), or naproxen (Aleve). Read and follow all instructions on the label. ¨ Do not take two or more pain medicines at the same time unless the doctor told you to. Many pain medicines contain acetaminophen, which is Tylenol. Too much Tylenol can be harmful. · If you think your pain medicine is making you sick to your stomach:  ¨ Take your medicine after meals (unless your doctor tells you not to). ¨ Ask your doctor for a different pain medicine. · If your doctor prescribed antibiotics, take them as directed. Do not stop taking them just because you feel better. You need to take the full course of antibiotics.   Incision care  · If you have strips of tape or glue on the incision, or cut, leave the tape/glue on for a week or until it falls off. · After 24 hours wash the area daily with warm, soapy water, and pat it dry. · You may have staples to hold the cut together. Keep them dry until your doctor takes them out. This is usually in 7 to 10 days. · Keep the area clean and dry. You may cover it with a gauze bandage if it weeps or rubs against clothing. Change the bandage every day. Ice   · To reduce swelling and pain, put ice or a cold pack on your belly for 10 to 20 minutes at a time. Do this every 1 to 2 hours. Put a thin cloth between the ice and your skin. Follow-up care is a key part of your treatment and safety. Be sure to make and go to all appointments, and call your doctor if you are having problems. Its also a good idea to know your test results and keep a list of the medicines you take. When should you call for help? Call 911 anytime you think you may need emergency care. For example, call if:  · You passed out (lost consciousness). · You have severe trouble breathing. · You have sudden chest pain and shortness of breath, or you cough up blood. Call your doctor now or seek immediate medical care if:  · You are sick to your stomach and cannot drink fluids. · You have pain that does not get better when you take your pain medicine. · You have signs of infection, such as:  ¨ Increased pain, swelling, warmth, or redness. ¨ Red streaks leading from the incision. ¨ Pus draining from the incision. ¨ Swollen lymph nodes in your neck, armpits, or groin. ¨ A fever. · Your urine turns dark brown or your stool is light-colored or wilber-colored. · Your skin or the whites of your eyes turn yellow. · Bright red blood has soaked through a large bandage over your incision. · You have signs of a blood clot, such as:  ¨ Pain in your calf, back of knee, thigh, or groin. ¨ Redness and swelling in your leg or groin.   · You have trouble passing urine or stool, especially if you have mild pain or swelling in your lower belly.  · You had a laparoscopic surgery and your shoulder pain lasts more than 24 hours or if you do not have a bowel movement after taking a laxative. After general anesthesia or intravenous sedation, for 24 hours or while taking prescription Narcotics:  · Limit your activities  · Do not drive and operate hazardous machinery  · Do not make important personal or business decisions  · Do  not drink alcoholic beverages  · If you have not urinated within 8 hours after discharge, please contact your surgeon on call. *  Please give a list of your current medications to your Primary Care Provider. *  Please update this list whenever your medications are discontinued, doses are      changed, or new medications (including over-the-counter products) are added. *  Please carry medication information at all times in case of emergency situations. These are general instructions for a healthy lifestyle:  No smoking/ No tobacco products/ Avoid exposure to second hand smoke  Surgeon General's Warning:  Quitting smoking now greatly reduces serious risk to your health. Obesity, smoking, and sedentary lifestyle greatly increases your risk for illness  A healthy diet, regular physical exercise & weight monitoring are important for maintaining a healthy lifestyle    You may be retaining fluid if you have a history of heart failure or if you experience any of the following symptoms:  Weight gain of 3 pounds or more overnight or 5 pounds in a week, increased swelling in our hands or feet or shortness of breath while lying flat in bed. Please call your doctor as soon as you notice any of these symptoms; do not wait until your next office visit. Recognize signs and symptoms of STROKE:  F-face looks uneven  A-arms unable to move or move unevenly  S-speech slurred or non-existent  T-time-call 911 as soon as signs and symptoms begin-DO NOT go       Back to bed or wait to see if you get better-TIME IS BRAIN.

## 2017-11-22 NOTE — BRIEF OP NOTE
BRIEF OPERATIVE NOTE    Date of Procedure: 11/22/2017   Preoperative Diagnosis: cholelithiasis  Postoperative Diagnosis: Acute on chronic Cholecystitis and cholelithiasis  Procedure(s):  CHOLECYSTECTOMY LAPAROSCOPIC  Surgeon(s) and Role:     * Jonathan Meade MD - Primary         Assistant Staff:       Surgical Staff:  Circ-1: Vashti Luz RN  Circ-2: Neri Villegas RN  Circ-Relief: Lary Arciniega RN  Scrub Tech-1: Jony Nava  Scrub Tech-2: Chalo Schrader  Scrub Tech-3: Marti Lopez  Event Time In   Incision Start 12:01 PM   Incision Close      Anesthesia: General   Estimated Blood Loss: 25 ml  Specimens:   ID Type Source Tests Collected by Time Destination   1 : gallbladder Preservative Gallbladder  Jonathan Meade MD 11/22/2017 12:40 PM Pathology      Findings: acute inflamed and thickened gallbladder, completely surrounded by omentum  Complications: none  Implants: * No implants in log *

## 2017-11-22 NOTE — IP AVS SNAPSHOT
[address] [address] 
[phone] Patient: Dana Ferrer MRN: [id number] About your hospitalization You were admitted on:  November 22, 2017 You last received care in the:  [facility] PACU You were discharged on:  November 22, 2017 Why you were hospitalized Your primary diagnosis was:  Not on File Things You Need To Do (next 8 weeks) Friday Dec 01, 2017 Global Post Op with Elda White MD at 11:45 AM  
Where:  [facility] Discharge Orders None A check rosalee indicates which time of day the medication should be taken. My Medications STOP taking these medications TYLENOL 325 mg tablet Generic drug:  acetaminophen TAKE these medications as instructed Instructions Each Dose to Equal  
 Morning Noon Evening Bedtime  
 finasteride 5 mg tablet Commonly known as:  PROSCAR Your last dose was: Your next dose is: Take 5 mg by mouth nightly. 5 mg FLOMAX 0.4 mg capsule Generic drug:  tamsulosin Your last dose was: Your next dose is: Take 0.4 mg by mouth nightly. 0.4 mg HYDROcodone-acetaminophen 5-325 mg per tablet Commonly known as:  Gema Farmer Your last dose was: Your next dose is:    
   
   
 1-2 tabs by mouth every 4 hours prn pain ZyrTEC 10 mg Cap Generic drug:  Cetirizine Your last dose was: Your next dose is: Take 10 mg by mouth every morning. 10 mg Where to Get Your Medications Information on where to get these meds will be given to you by the nurse or doctor. ! Ask your nurse or doctor about these medications HYDROcodone-acetaminophen 5-325 mg per tablet Discharge Instructions Remove plastic dressing and gauze after 48 hours, leave sterile strips on for 7-10 days, OK to shower Cholecystectomy: What to Expect at [facility] Your Recovery After your surgery, it is normal to feel weak and tired for several days after you return home. Your belly may be swollen. Since you had laparoscopic surgery, you may also have pain in your shoulder for about 24 hours. You may have gas or need to burp a lot at first, and a few people get diarrhea. The diarrhea usually goes away in 2 to 4 weeks, but it may last longer. For a laparoscopic surgery, most people can go back to work or their normal routine in 1 to 2 weeks, but it may take longer, depending on the type of work you do. This care sheet gives you a general idea about how long it will take for you to recover. However, each person recovers at a different pace. Follow the steps below to get better as quickly as possible. How can you care for yourself at home? Activity · Rest when you feel tired. Getting enough sleep will help you recover. · Try to walk each day. Start out by walking a little more than you did the day before. Gradually increase the amount you walk. Walking boosts blood flow and helps prevent pneumonia and constipation. · For about 2 to 4 weeks, avoid lifting anything that would make you strain or anything over 10 pounds. · Avoid strenuous activities, such as biking, jogging, weightlifting, and aerobic exercise, until your doctor says it is okay. · You may shower 24 hours after surgery, if your doctor okays it. Pat the cut (incision) dry. Do not take a bath until your doctor tells you it is okay. · You may drive when you are no longer taking pain medicine and can quickly move your foot from the gas pedal to the brake. You must also be able to sit comfortably for a long period of time, even if you do not plan to go far. You might get caught in traffic. · Your doctor will tell you when you can have sex again. Diet · Eat smaller meals more often instead of fewer larger meals. You can eat a normal diet, but avoid eating fatty foods for about 1 month. Fatty foods include hamburger, whole milk, cheese, and many snack foods. If your stomach is upset, try bland, low-fat foods like plain rice, broiled chicken, toast, and yogurt. · Drink plenty of fluids (unless your doctor tells you not to). · If you have diarrhea, try avoiding spicy foods, dairy products, fatty foods, and alcohol. You can also watch to see if specific foods cause it, and stop eating them. If the diarrhea continues for more than 2 weeks, talk to your doctor. · You may notice that your bowel movements are not regular right after your surgery. This is common. Try to avoid constipation and straining with bowel movements. You may want to take a fiber supplement every day. If you have not had a bowel movement after a couple of days, ask your doctor about taking a mild laxative. Medicines · Take pain medicines exactly as directed. ¨ If the doctor gave you a prescription medicine for pain, take it as prescribed. ¨ If you are not taking a prescription pain medicine, take an over-the-counter medicine such as acetaminophen (Tylenol), ibuprofen (Advil, Motrin), or naproxen (Aleve). Read and follow all instructions on the label. ¨ Do not take two or more pain medicines at the same time unless the doctor told you to. Many pain medicines contain acetaminophen, which is Tylenol. Too much Tylenol can be harmful. · If you think your pain medicine is making you sick to your stomach: 
¨ Take your medicine after meals (unless your doctor tells you not to). ¨ Ask your doctor for a different pain medicine. · If your doctor prescribed antibiotics, take them as directed. Do not stop taking them just because you feel better. You need to take the full course of antibiotics. Incision care · If you have strips of tape or glue on the incision, or cut, leave the tape/glue on for a week or until it falls off. · After 24 hours wash the area daily with warm, soapy water, and pat it dry. · You may have staples to hold the cut together. Keep them dry until your doctor takes them out. This is usually in 7 to 10 days. · Keep the area clean and dry. You may cover it with a gauze bandage if it weeps or rubs against clothing. Change the bandage every day. Ice · To reduce swelling and pain, put ice or a cold pack on your belly for 10 to 20 minutes at a time. Do this every 1 to 2 hours. Put a thin cloth between the ice and your skin. Follow-up care is a key part of your treatment and safety. Be sure to make and go to all appointments, and call your doctor if you are having problems. Its also a good idea to know your test results and keep a list of the medicines you take. When should you call for help? Call 911 anytime you think you may need emergency care. For example, call if: 
· You passed out (lost consciousness). · You have severe trouble breathing. · You have sudden chest pain and shortness of breath, or you cough up blood. Call your doctor now or seek immediate medical care if: 
· You are sick to your stomach and cannot drink fluids. · You have pain that does not get better when you take your pain medicine. · You have signs of infection, such as: 
¨ Increased pain, swelling, warmth, or redness. ¨ Red streaks leading from the incision. ¨ Pus draining from the incision. ¨ Swollen lymph nodes in your neck, armpits, or groin. ¨ A fever. · Your urine turns dark brown or your stool is light-colored or wilber-colored. · Your skin or the whites of your eyes turn yellow. · Bright red blood has soaked through a large bandage over your incision. · You have signs of a blood clot, such as: 
¨ Pain in your calf, back of knee, thigh, or groin. ¨ Redness and swelling in your leg or groin.  
· You have trouble passing urine or stool, especially if you have mild pain or swelling in your lower belly. · You had a laparoscopic surgery and your shoulder pain lasts more than 24 hours or if you do not have a bowel movement after taking a laxative. After general anesthesia or intravenous sedation, for 24 hours or while taking prescription Narcotics: · Limit your activities · Do not drive and operate hazardous machinery · Do not make important personal or business decisions · Do  not drink alcoholic beverages · If you have not urinated within 8 hours after discharge, please contact your surgeon on call. *  Please give a list of your current medications to your Primary Care Provider. *  Please update this list whenever your medications are discontinued, doses are 
    changed, or new medications (including over-the-counter products) are added. *  Please carry medication information at all times in case of emergency situations. These are general instructions for a healthy lifestyle: No smoking/ No tobacco products/ Avoid exposure to second hand smoke Surgeon General's Warning:  Quitting smoking now greatly reduces serious risk to your health. Obesity, smoking, and sedentary lifestyle greatly increases your risk for illness A healthy diet, regular physical exercise & weight monitoring are important for maintaining a healthy lifestyle You may be retaining fluid if you have a history of heart failure or if you experience any of the following symptoms:  Weight gain of 3 pounds or more overnight or 5 pounds in a week, increased swelling in our hands or feet or shortness of breath while lying flat in bed. Please call your doctor as soon as you notice any of these symptoms; do not wait until your next office visit. Recognize signs and symptoms of STROKE: 
F-face looks uneven A-arms unable to move or move unevenly S-speech slurred or non-existent T-time-call 911 as soon as signs and symptoms begin-DO NOT go  
 Back to bed or wait to see if you get better-TIME IS BRAIN. ACO Transitions of Care Introducing [employer] [address] Fatimah Schwab offers a voluntary care coordination program to provide high quality service and care to [facility] fee-for-service beneficiaries. Vivi Garnett was designed to help you enhance your health and well-being through the following services: ? Transitions of Care  support for individuals who are transitioning from one care setting to another (example: Hospital to home). ? Chronic and Complex Care Coordination  support for individuals and caregivers of those with serious or chronic illnesses or with more than one chronic (ongoing) condition and those who take a number of different medications. If you meet specific medical criteria, a On license of [facility] [address] may call you directly to coordinate your care with your primary care physician and your other care providers. For questions about the [facility] programs, please, contact your physicians office. For general questions or additional information about Accountable Care Organizations: 
Please visit www.medicare.gov/acos. html or call 1-800-MEDICARE (6-260.714.5324) TTY users should call 3-345.582.4669. Introducing [facility]! Stephanie Montejo introduces [employer] patient portal. Now you can access parts of your medical record, email your doctor's office, and request medication refills online. 1. In your internet browser, go to [email or web address] 2. Click on the First Time User? Click Here link in the Sign In box. You will see the New Member Sign Up page. 3. Enter your [employer] Access Code exactly as it appears below. You will not need to use this code after youve completed the sign-up process. If you do not sign up before the expiration date, you must request a new code.  
 
· [employer] Access Code: [email or web address] 
 Expires: 1/21/2018  8:05 AM 
 
4. Enter the last four digits of your Social Security Number (xxxx) and Date of Birth (mm/dd/yyyy) as indicated and click Submit. You will be taken to the next sign-up page. 5. Create a "[employer]t ID. This will be your [employer] login ID and cannot be changed, so think of one that is secure and easy to remember. 6. Create a [employer] password. You can change your password at any time. 7. Enter your Password Reset Question and Answer. This can be used at a later time if you forget your password. 8. Enter your e-mail address. You will receive e-mail notification when new information is available in [address] 9. Click Sign Up. You can now view and download portions of your medical record. 10. Click the Download Summary menu link to download a portable copy of your medical information. If you have questions, please visit the Frequently Asked Questions section of the [employer] website. Remember, [employer] is NOT to be used for urgent needs. For medical emergencies, dial 911. Now available from your iPhone and Android! Providers Seen During Your Hospitalization Provider Specialty Primary office phone Jovanny Myrick MD General Surgery [phone] Your Primary Care Physician (PCP) Primary Care Physician Office Phone Office Fax [address], [address] [phone] You are allergic to the following Allergen Reactions Percocet (Oxycodone-Acetaminophen) Other (comments) Hot and cold flashes. Nasal passages swelled. Recent Documentation Height Weight BMI Smoking Status 1.803 m 71.4 kg 21.97 kg/m2 Former Smoker Emergency Contacts Name Discharge Info Relation Home Work Mobile Dhara Dyer DISCHARGE CAREGIVER [3] Spouse [3] [phone] [facility] DISCHARGE CAREGIVER [3] Son [22] [phone] Patient Belongings The following personal items are in your possession at time of discharge: 
  Dental Appliances: None  Visual Aid: Glasses      Home Medications: None   Jewelry: None  Clothing: Footwear, Hat, Pants, Shirt, Undergarments, [place]    Other Valuables: Eyeglasses, [employer], Other (comment) (bilateral hearing aids) Please provide this summary of care documentation to your next provider. Signatures-by signing, you are acknowledging that this After Visit Summary has been reviewed with you and you have received a copy. Patient Signature:  ____________________________________________________________ Date:  ____________________________________________________________  
  
[facility] Signature:  ____________________________________________________________ Date:  ____________________________________________________________

## 2017-11-22 NOTE — ANESTHESIA POSTPROCEDURE EVALUATION
Post-Anesthesia Evaluation and Assessment    Patient: Abdullahi Torres MRN: [id number]  SSN: [id number]    YOB: 1945  Age: 67 y.o. Sex: male       Cardiovascular Function/Vital Signs  Visit Vitals    /74 (BP 1 Location: Left arm, BP Patient Position: At rest;Supine)    Pulse 72    Temp 36.4 °C (97.5 °F)    Resp 18    Ht 5' 11\" (1.803 m)    Wt 71.4 kg (157 lb 8 oz)    SpO2 94%    BMI 21.97 kg/m2       Patient is status post general anesthesia for Procedure(s):  CHOLECYSTECTOMY LAPAROSCOPIC. Nausea/Vomiting: None    Postoperative hydration reviewed and adequate. Pain:  Pain Scale 1: Numeric (0 - 10) (11/22/17 1401)  Pain Intensity 1: 2 (11/22/17 1401)   Managed    Neurological Status:   Neuro (WDL): Within Defined Limits (11/22/17 1401)  Neuro  Neurologic State: Drowsy (11/22/17 1309)   At baseline    Mental Status and Level of Consciousness: Alert and oriented     Pulmonary Status:   O2 Device: Room air (11/22/17 1401)   Adequate oxygenation and airway patent    Complications related to anesthesia: None    Post-anesthesia assessment completed.  No concerns    Signed By: Betty Lee MD     November 22, 2017

## 2017-11-23 NOTE — OP NOTES
[facility]   OPERATIVE REPORT       Name:  Karyn Joy   MR#:  [id number]   :  1945   Account #:  [de-identified]   Date of Adm:  2017       DATE OF SURGERY: 2017    PREOPERATIVE DIAGNOSIS: Cholelithiasis. POSTOPERATIVE DIAGNOSIS: Acute on chronic cholecystitis and   cholelithiasis. NAME OF PROCEDURE: Laparoscopic cholecystectomy. SURGEON: Tram Cueto MD    ANESTHESIA:  general    ESTIMATED BLOOD LOSS: About 25 mL. SPECIMEN:       COMPLICATIONS:     INDICATIONS: This is a 28-year-old gentleman who presented with   acute gallbladder attack about a couple weeks ago in the   emergency room. His symptoms have been resolved and he presented   to my office with documented gallstones. Given his symptoms,   surgery offered to him. He understood the risks and benefits,   agreed to proceed. FINDINGS: He actually has acute on chronic cholecystitis. His   gallbladder was completely surrounded by omentum. The cystic duct   was thickened and inflamed and complete dissection of the cyst   was deemed unsafe and so we used the stapler technique. The   infundibulum of the gallbladder was divided. PROCEDURE: After informed consent obtained, the patient was   brought into the operating room and left in supine position. General anesthesia was administered. The patient was then   prepped and draped in the usual routine fashion. As noted, he   had a previous laparotomy incision, so we chose not to use   Marlena cannula. Veress needle technique was used to create   pneumoperitoneum and then a 5 mm trocar placed in the right   upper quadrant with a scope over trocar technique. The abdominal wall was entered under direct vision and the under   direct vision, the second 5 mm trocar was placed in the right   abdomen avoiding laparotomy incision and the third placed in the   right upper quadrant in the subcostal region and a 12 mm trocar   placed at the epigastrium.  The findings were as described above. The omentum was carefully dissected off the gallbladder and then   we were able to put the retractor on the gallbladder. Dissection   continued and was carried down to the cystic duct area and is   extremely inflamed and thickened. The complete dissection of the   cystic duct was deemed unsafe, but I was able to make a window   right below the infundibulum of the gallbladder. The   infundibulum was circumferentially isolated. The cystic artery   actually was isolated separately which was clipped and divided   first. Then the Endo-SERENITY stapler with a purple load was brought   in. The infundibulum of the gallbladder was divided. Then the   gallbladder was removed from the liver bed with cautery device   and retrieved from the peritoneal cavity with the Endobag. Surgical field was copiously irrigated and hemostasis obtained. The return fluid was clear. No bile, no blood identified. Then   all the trocars were removed. The incisions closed with 4-0   Vicryl stitch in subcuticular fashion. The patient tolerated the   procedure well, transferred to recovery room in stable   condition. All the instrument count and lap count correct. Estimated blood loss was about 25 mL.         Kimberly Ocasio MD      BY / SALUD   D:  11/22/2017   13:00   T:  11/23/2017   08:57   Job #:  [id number]

## 2019-03-06 ENCOUNTER — HOSPITAL ENCOUNTER (OUTPATIENT)
Dept: GENERAL RADIOLOGY | Age: 74
Discharge: HOME | End: 2019-03-06
Payer: MEDICARE

## 2019-03-06 DIAGNOSIS — S69.92XA INJURY OF LEFT WRIST, INITIAL ENCOUNTER: ICD-10-CM

## 2019-03-06 PROCEDURE — 73110 X-RAY EXAM OF WRIST: CPT

## 2019-03-07 NOTE — PROGRESS NOTES
The xray doesn't show a clear fracture, however, there is a change in the xray that could indicate a fracture, but it is not clearly defined. Due to symptoms ongoing x 2 weeks, i'm going to refer him to [address] orthopedic to have a closer look at this.

## 2021-09-03 NOTE — PROGRESS NOTES
Lui Ibarra  : [facility] [address], [place], [address] [place] [address]  [phone]   [phone]         OUTPATIENT PHYSICAL THERAPY:Discontinuation Summary 2017    ICD-10: Treatment Diagnosis 1: neck pain (M54.2)              Treatment Diagnosis 2: cervical radiculopathy (M54.12)  Precautions/Allergies:   Percocet [oxycodone-acetaminophen]   Fall Risk Score: 2 (? 5 = High Risk)  MD Orders: Evaluate and Treat MEDICAL/REFERRING DIAGNOSIS:  Cervicalgia [M54.2]   DATE OF ONSET: beginning of 2017  REFERRING PHYSICIAN: Feroz Larson NP  RETURN PHYSICIAN APPOINTMENT: not scheduled     INITIAL ASSESSMENT:  Mr. April Keller is a 66year old male presenting with neck pain and radicular pain into the top of his head and down to his shoulder that started in the beginning of 2017. He reported that he has been working on cars in a shop at home on a lift, which requires him to look up for long periods of time. Patient has been seen for 12 sessions of therapy from 2017 to 3/6/2017 with significant success. He did not call to schedule additional sessions of therapy but did report feeling significantly better during his last appointment. He was able to sleep for longer periods of time and had better tolerance to working on his cars. He was issued a complete HEP at the last appointment of physical therapy, has met most preset goals, and is discharged at this time. PROBLEM LIST (Impacting functional limitations):  1. Decreased Strength  2. Decreased ADL/Functional Activities  3. Decreased Transfer Abilities  4. Decreased Activity Tolerance  5. Increased Fatigue  6. Decreased Flexibility/Joint Mobility INTERVENTIONS PLANNED:  1. Cold  2. Electrical Stimulation  3. Heat  4. Home Exercise Program (HEP)  5. Manual Therapy  6. Neuromuscular Re-education/Strengthening  7. Range of Motion (ROM)  8.  Therapeutic Exercise/Strengthening  9. Ultrasound (US)   TREATMENT PLAN:  Effective Dates: 1/26/2017 TO 3/9/2017. Frequency/Duration: Patient has been seen for 12 sessions of therapy from 1/26/2017 to 3/6/2017 with significant success. He did not call to schedule additional sessions of therapy but did report feeling significantly better during his last appointment. He was able to sleep for longer periods of time and had better tolerance to working on his cars. He was issued a complete HEP at the last appointment of physical therapy, has met most preset goals, and is discharged at this time. GOALS: (Goals have been discussed and agreed upon with patient.)  Short-Term Functional Goals: Time Frame: 1/26/2017 to 2/16/2017  1. Patient demonstrates independence with home exercise program without verbal cueing provided by therapist. - GOAL MET  2. Improve left arm and head radicular symptoms with performance of HEP and use of traction. - GOAL MET  3. Improve posture with decreased forward head, forward shoulders, and thoracic kyphosis in sitting and standing. - GOAL MET  Discharge Goals: Time Frame: 1/26/2017 to 3/9/2017  1. Improve pain to 3/10 at the most with working on his cars at home, looking up for house repairs, and sleeping. - GOAL MET  2. Improve strength of deep cervical flexors, scapular retractors, and thoracic spinal extensors to at least 4/5 in order to improve tolerance to working on cars at home. - NOT MET  3. Improve form with lifting/carrying/pushing/pulling activities, as well as neutral spine with overhead activities. - GOAL MET  4. Improve tenderness to palpation and spasm of left cervical paraspinals, suboccipitals, and scalenes in order to decrease pain with sleeping. - GOAL MET  5. Improve tolerance to looking up working on cars for up to 10 minutes before the need to rest due to neck pain. - GOAL MET  6.  Improve Neck Disability Index score to 2/50 from 8/50. - NOT MET  Rehabilitation Potential For Stated Goals: Good  Regarding Chaitanya Dyer's therapy, I certify that the treatment plan above will be carried out by a therapist or under their direction. Thank you for this referral,  Catherine Chew PT     Referring Physician Signature: Fany Quintero NP              Date                    The information in this section was collected on 1/26/2017 (except where otherwise noted). HISTORY:   History of Present Injury/Illness (Reason for Referral):  Mr. Xena Walsh is a 66year old male presenting with neck pain and radicular pain into the top of his head and down to his shoulder that started in the beginning of January 2017. He reported that he has been working on cars in a shop at home on a lift, which requires him to look up for long periods of time. He reports that looking up, especially for long periods of time or into full extension creates his neck and radiating pain the most.  He denies weakness and tingling at this time, but reports that he has been unable to work on cars like he used to due to the pain. He is eager to improve tolerance to this activity, as well as any other activities requiring extended extension of the neck and head. Past Medical History/Comorbidities:   Mr. Xena Walsh  has a past medical history of Cancer ([facility] [address] (2006); Chronic maxillary sinusitis (8/14/2015); Diverticulitis; H/O colostomy (11/2012); and Microscopic hematuria (8/14/2015). He also has no past medical history of Abuse or Headache. Mr. Xena Walsh  has a past surgical history that includes colostomy (11/2012); hernia repair (1970s); colostomy take down (1/31/13); tonsillectomy; carpal tunnel release; appendectomy (11/2012); and other surgical (4/2014). Social History/Living Environment:    Patient lives at home with wife and reports general independence to modified independence with household chores and ADLs. Requires assistance with moderate to heavy lifting and overhead activities due to neck pain.   Prior Level of Function/Work/Activity:  Independent without dysfunction. 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 Dominant Side:         RIGHT  Current Medications:       Current Outpatient Prescriptions:     tamsulosin (FLOMAX) 0.4 mg capsule, Take 0.4 mg by mouth daily. , Disp: , Rfl:     finasteride (PROSCAR) 5 mg tablet, Take 5 mg by mouth nightly., Disp: , Rfl:    Date Last Reviewed:  6/13/2017   Number of Personal Factors/Comorbidities that affect the Plan of Care: 0: LOW COMPLEXITY   EXAMINATION:     Patient denies any increase of symptoms with coughing, sneezing or valsalva maneuver. Patient denies any headaches, changes in vision, dizziness, vertigo, nausea, drop attacks, black outs, tinnitus, dysphagia, dysarthria, LE symptoms or bowel/bladder dysfunction. Observation/Orthostatic Postural Assessment:  Patient sits with minimal (From moderate forward head, forward shoulders, and thoracic kyphosis). He is able to reverse this with cuing and is able to hold it for longer periods of time with and without cuing (From is unable to hold the position for long due to weakness and stiffness). He still has difficulty with neutral head positioning but is able to hold the position with verbal and manual cuing. No significant deformity is noted of the spine and he is now able to move the head and neck with conversation (From patient is very rigid during conversation with cervical mobility especially to the left). Palpation:  Gross tenderness to palpation and spasm of left cervical paraspinals, scalenes, and suboccipitals. Flexibility moderately (From severely) limited of bilateral pectoralis minor and major. Left upper trapezius moderately (From severely) limited, right moderately. Left levator scapulae limited moderately (From severely), right limited moderately. Suboccipitals limited moderately (From severely) bilaterally.       Vertebral-Basilar Screen: Hautant's test is negative. Cranial extension test is negative. ROM:   Cervical extension (degrees): 40° with pain into left shoulder and neck   Cervical flexion: 60°   Cervical left side bend: 25° with neck pain   Cervical right side bend: 35°   Cervical left rotation: 30°   Cervical right rotation: 50°     Strength: With upper quarter screen grossly symmetrical and WNL.  on the right is 60 lbs, left is 50 lbs. Scapular retractors 4/5 (From 3/5). Deep cervical flexors 3/5. Thoracic spinal extensors 3/5. Joint Mobility:  Moderate (From severe) limitations with traction, side glides and posterior to anterior glides not tested due to presence of radicular symptoms. Special Tests:  Ligament stress tests performed through upper cervical spine for transverse ligament including Sharp-Gordo test is negative, and Duvall-Axis shear test is negative. Duvall-Axis side flexion test for integrity of alar ligament is negative. Spurling test is positive for neck pain and radicular pain into the left shoulder. Cervical distraction is positive for symptom relief. Neurovascular testing for thoracic outlet syndrome is negative. Neurological Screen:  Myotomes: Key muscle strength testing for bilateral UE is WNL. Dermatomes: Sensation testing through bilateral upper quadrants for light touch is WNL. Reflexes: Biceps (C5), brachioradialis (C6), and triceps (C7) are 1+, 1+ and 1+. Neural tension tests: Upper limb tension test is negative. Slump test is negative. Functional Mobility:  Patient is grossly independent to modified independence with most ADLs and household chores. He requires assistance with moderate to heavy lifting when working on cars with his grandson, and especially when looking up using lift in his shop. Body Structures Involved:  1. Nerves  2. Bones  3. Joints  4. Muscles  5. Ligaments Body Functions Affected:  1. Sensory/Pain  2.  Neuromusculoskeletal Activities and Participation Affected:  1. General Tasks and Demands  2. Working on and restoring cars    Number of elements (examined above) that affect the Plan of Care: 3: MODERATE COMPLEXITY   CLINICAL PRESENTATION:   Presentation: Stable and uncomplicated: LOW COMPLEXITY   CLINICAL DECISION MAKING:   Outcome Measure: Tool Used: Neck Disability Index (NDI)  Score:  Initial: 8/50  Most Recent: 9/50 (Date: 2/10/2017)   Interpretation of Score: The Neck Disability Index is a revised form of the Oswestry Low Back Pain Index and is designed to measure the activities of daily living in person's with neck pain. Each section is scored on a 0-5 scale, 5 representing the greatest disability. The scores of each section are added together for a total score of 50. Score 0 1-10 11-20 21-30 31-40 41-49 50   Modifier CH CI CJ CK CL CM CN     ? Changing and Maintaining Body Position:        - GOAL STATUS: CI - 1%-19% impaired, limited or restricted - lower limit    - D/C STATUS:  CI - 1%-19% impaired, limited or restricted     Medical Necessity:   Patient has been seen for 12 sessions of therapy from 1/26/2017 to 3/6/2017 with significant success. He did not call to schedule additional sessions of therapy but did report feeling significantly better during his last appointment. He was able to sleep for longer periods of time and had better tolerance to working on his cars. He was issued a complete HEP at the last appointment of physical therapy, has met most preset goals, and is discharged at this time. Use of outcome tool(s) and clinical judgement create a POC that gives a: Clear prediction of patient's progress: LOW COMPLEXITY            TREATMENT:   (In addition to Assessment/Re-Assessment sessions the following treatments were rendered)    Pre-treatment Symptoms/Complaints:  All objective and subjective measurements as taken from last scheduled appointment.     Pain: Initial:   Pain Intensity 1: 3  Pain Location 1: Neck  Pain Orientation 1: Left  Post Session:  0/10     Therapeutic Exercise: ():  Exercises per grid below to improve mobility, strength, balance and coordination. Required minimal visual, verbal and manual cues to promote proper body alignment, promote proper body posture, promote proper body mechanics and promote proper body breathing techniques. Progressed resistance, range, repetitions and complexity of movement as indicated. (used abbreviations: BET-back education training) Date:  3/6/2017 Date:  2-27-17 Date:  3-2-17   Activity/Exercise Parameters Parameters Parameters   Arm bike 4 forward, 4 backward, level 4 4 forward, 4 backward level 6 4 min forward  4 min back, level 4   Seated scapular retraction - Band rows seated Blue 3 x 10 Blue 3 x 10 Blue 3 X 10   Seated pulldowns Blue 3 x 10 Blue 3 x 10 Blue 3 x 10   Levator scapulae stretch 3 x 30 sec 2 x 30 sec 2 x 30 sec   Doorway stretch 3 x 30 sec 3 x 30 sec 3 x 30 sec   Upper thoracic stretch 3 x 30 sec 3 x 30 sec 3 x 30 sec   Chest stretch 3 x 30 sec 2 x 30 sec Hands clasp behind back 3 x 30 sec   Supine cervical chin tucks 5 sec x 10 5 sec x 10 seated 5 sec x 8 with manual cuing with legs propped           Manual Therapy (): improve joint and soft tissue mobility  · Supine traction of the cervical spine  · Supine suboccipital traction with retraction of the cervical spine  · Supine suboccipital release  · Left sided stretching of upper trapezius and levator scapulae  · Deep tissue mobilization of left sided cervical paraspinals and suboccipitals in sitting.    · Left inferior 1st rib mobilizations with scalenes stretching  (Used abbreviations: MET - muscle energy technique; PNF - proprioceptive neuromuscular facilitation; NMR - neuromuscular re-education; a/p - anterior to posterior; p/a - posterior to anterio; NT - not tested)    Therapeutic Modalities: for edema and pain HEP: As above; handouts given to patient for all exercises. Treatment/Session Assessment:    · Response to Treatment:  Patient has been seen for 12 sessions of therapy from 1/26/2017 to 3/6/2017 with significant success. He did not call to schedule additional sessions of therapy but did report feeling significantly better during his last appointment. He was able to sleep for longer periods of time and had better tolerance to working on his cars. He was issued a complete HEP at the last appointment of physical therapy, has met most preset goals, and is discharged at this time. · Compliance with Program/Exercises: compliant most of the time. · Recommendations/Intent for next treatment session:  Patient is discharged at this time.     Chandra Littlejohn, PT d/w MD Becker  pt does not appear w/ ROM, and MD Becker verifies adequate MAMADOU at this time  maternal and fetal status reassuring at this time  pt to be discharged home with general OB labor instructions  pt to return to hospital for monitoring in 48 hours, for BPP & NST  daily kick counts  pt to follow up with OBGYN as directed

## 2022-03-19 PROBLEM — K80.20 CALCULUS OF GALLBLADDER WITHOUT CHOLECYSTITIS WITHOUT OBSTRUCTION: Status: ACTIVE | Noted: 2017-11-18

## 2022-07-06 ENCOUNTER — HOSPITAL ENCOUNTER (EMERGENCY)
Age: 77
Discharge: HOME | End: 2022-07-06
Attending: EMERGENCY MEDICINE
Payer: MEDICARE

## 2022-07-06 ENCOUNTER — HOSPITAL ENCOUNTER (EMERGENCY)
Dept: CT IMAGING | Age: 77
Discharge: HOME | End: 2022-07-09
Payer: MEDICARE

## 2022-07-06 VITALS
SYSTOLIC BLOOD PRESSURE: 158 MMHG | OXYGEN SATURATION: 100 % | HEIGHT: 70 IN | TEMPERATURE: 97.9 F | BODY MASS INDEX: 20.04 KG/M2 | DIASTOLIC BLOOD PRESSURE: 82 MMHG | HEART RATE: 70 BPM | RESPIRATION RATE: 16 BRPM | WEIGHT: 140 LBS

## 2022-07-06 DIAGNOSIS — R42 VERTIGO: Primary | ICD-10-CM

## 2022-07-06 LAB
ALBUMIN SERPL-MCNC: 3.3 G/DL (ref 3.2–4.6)
ALBUMIN/GLOB SERPL: 0.8 {RATIO} (ref 1.2–3.5)
ALP SERPL-CCNC: 123 U/L (ref 50–136)
ALT SERPL-CCNC: 19 U/L (ref 12–65)
ANION GAP SERPL CALC-SCNC: 6 MMOL/L (ref 7–16)
AST SERPL-CCNC: 14 U/L (ref 15–37)
BILIRUB SERPL-MCNC: 0.4 MG/DL (ref 0.2–1.1)
BUN SERPL-MCNC: 25 MG/DL (ref 8–23)
CALCIUM SERPL-MCNC: 9.3 MG/DL (ref 8.3–10.4)
CHLORIDE SERPL-SCNC: 104 MMOL/L (ref 98–107)
CO2 SERPL-SCNC: 28 MMOL/L (ref 21–32)
CREAT SERPL-MCNC: 0.8 MG/DL (ref 0.8–1.5)
EKG ATRIAL RATE: 65 BPM
EKG DIAGNOSIS: NORMAL
EKG P AXIS: 74 DEGREES
EKG P-R INTERVAL: 172 MS
EKG Q-T INTERVAL: 418 MS
EKG QRS DURATION: 104 MS
EKG QTC CALCULATION (BAZETT): 434 MS
EKG R AXIS: -61 DEGREES
EKG T AXIS: 71 DEGREES
EKG VENTRICULAR RATE: 65 BPM
ERYTHROCYTE [DISTWIDTH] IN BLOOD BY AUTOMATED COUNT: 13.5 % (ref 11.9–14.6)
GLOBULIN SER CALC-MCNC: 4 G/DL (ref 2.3–3.5)
GLUCOSE SERPL-MCNC: 98 MG/DL (ref 65–100)
HCT VFR BLD AUTO: 40.1 % (ref 41.1–50.3)
HGB BLD-MCNC: 13 G/DL (ref 13.6–17.2)
MCH RBC QN AUTO: 29.5 PG (ref 26.1–32.9)
MCHC RBC AUTO-ENTMCNC: 32.4 G/DL (ref 31.4–35)
MCV RBC AUTO: 90.9 FL (ref 79.6–97.8)
NRBC # BLD: 0 K/UL (ref 0–0.2)
PLATELET # BLD AUTO: 196 K/UL (ref 150–450)
PMV BLD AUTO: 9.2 FL (ref 9.4–12.3)
POTASSIUM SERPL-SCNC: 4 MMOL/L (ref 3.5–5.1)
PROT SERPL-MCNC: 7.3 G/DL (ref 6.3–8.2)
RBC # BLD AUTO: 4.41 M/UL (ref 4.23–5.6)
SODIUM SERPL-SCNC: 138 MMOL/L (ref 138–145)
WBC # BLD AUTO: 7.4 K/UL (ref 4.3–11.1)

## 2022-07-06 PROCEDURE — 93005 ELECTROCARDIOGRAM TRACING: CPT | Performed by: EMERGENCY MEDICINE

## 2022-07-06 PROCEDURE — 80053 COMPREHEN METABOLIC PANEL: CPT

## 2022-07-06 PROCEDURE — 70450 CT HEAD/BRAIN W/O DYE: CPT

## 2022-07-06 PROCEDURE — 85027 COMPLETE CBC AUTOMATED: CPT

## 2022-07-06 PROCEDURE — 99284 EMERGENCY DEPT VISIT MOD MDM: CPT

## 2022-07-06 RX ORDER — MECLIZINE HYDROCHLORIDE 25 MG/1
25 TABLET ORAL 3 TIMES DAILY PRN
Qty: 15 TABLET | Refills: 0 | Status: SHIPPED | OUTPATIENT
Start: 2022-07-06 | End: 2022-07-16

## 2022-07-06 RX ORDER — ONDANSETRON 4 MG/1
4 TABLET, FILM COATED ORAL EVERY 8 HOURS PRN
Qty: 12 TABLET | Refills: 0 | Status: SHIPPED | OUTPATIENT
Start: 2022-07-06

## 2022-07-06 ASSESSMENT — ENCOUNTER SYMPTOMS
RESPIRATORY NEGATIVE: 1
EYES NEGATIVE: 1
GASTROINTESTINAL NEGATIVE: 1

## 2022-07-06 ASSESSMENT — PAIN SCALES - GENERAL: PAINLEVEL_OUTOF10: 0

## 2022-07-06 NOTE — ED TRIAGE NOTES
Patient arrives in wheelchair to triage with mask in place. Reports getting up at 0400 am with dizziness. Unable to stand up out of bed due to room spinning. Denies unilateral weakness, numbness, vision changes, trouble with speech. No hx vertigo. No hx stroke. Had significant crush injury to right leg and pelvis 2 months ago. Patient reports had covid 13 days ago. Does not currently take a blood thinner.

## 2022-07-06 NOTE — ED PROVIDER NOTES
[facility] Emergency Department Provider Note                   PCP:                None Provider               Age: 67 y.o. Sex: male       ICD-10-CM    1. Vertigo  R42        DISPOSITION Decision To Discharge 07/06/2022 05:22:41 PM       MDM  Number of Diagnoses or Management Options  Vertigo  Diagnosis management comments: Vertigo  CT head - no acute process  Labs reviewed     EKG - sinus rhythm, rate 65, incomplete RBBB - I read EKG without cardiology    Results and instructions discussed with patient and friend  Adrianne Mcclendon and Cristina  He needs to establish PCP - given # for [facility] physician services  Return with new or worse symptoms. Amount and/or Complexity of Data Reviewed  Clinical lab tests: ordered and reviewed  Tests in the radiology section of CPT®: ordered and reviewed  Independent visualization of images, tracings, or specimens: yes    Patient Progress  Patient progress: stable       Orders Placed This Encounter   Procedures    CT HEAD WO CONTRAST    CBC    Comprehensive Metabolic Panel    EKG 12 Lead    Insert peripheral IV        Hamida Rosales is a 67 y.o. male who presents to the Emergency Department with chief complaint of    Chief Complaint   Patient presents with    Dizziness      Patient states that he woke up at 4 AM and got up to go the bathroom and had an episode of the room spinning. He laid back down on the bed. He has had some intermittent feeling of the room spinning during the day today no associated nausea or vomiting. No headache. No numbness or weakness. No fever. He had a similar episode several months ago that resolved spontaneously. He recently was treated for a pelvic fracture and for COVID. He has longstanding hearing loss bilaterally with tinnitus. Change in his hearing recently. All other systems reviewed and are negative. Review of Systems   Constitutional: Negative. HENT: Positive for hearing loss and tinnitus. Negative for ear pain. Eyes: Negative. Respiratory: Negative. Cardiovascular: Negative. Gastrointestinal: Negative. Genitourinary: Negative. History of prostate cancer and takes Flomax   Musculoskeletal: Positive for arthralgias. Recent pelvic fracture   Skin: Positive for wound (right lower leg from hematoma under treatment). Neurological: Positive for dizziness. Negative for tremors, seizures, syncope, facial asymmetry, speech difficulty, weakness, light-headedness, numbness and headaches. Hematological: Negative. Psychiatric/Behavioral: Negative.         Past Medical History:   Diagnosis Date    Arthritis     Cancer ([facility] [address] 2018    Cancer [facility]) 2006    prostate ca-  no treatments recommended at this point    Chronic maxillary sinusitis 8/14/2015    Diverticulitis     Gall bladder disease     H/O colostomy 11/2012    diverticulum rupture    H/O seasonal allergies     Microscopic hematuria 8/14/2015        Past Surgical History:   Procedure Laterality Date    APPENDECTOMY  11/2012    CARPAL TUNNEL RELEASE      CHOLECYSTECTOMY  2018    COLOSTOMY  11/2012    partial colon resection - colostomy    COLOSTOMY CLOSURE  1/31/13    4 day post op stay    HERNIA REPAIR  1970s    ORTHOPEDIC SURGERY Bilateral     carpal tunnel release    OTHER SURGICAL HISTORY  4/2014    prostate bx    PROSTATECTOMY      prostate bx's multiple since 2006    TONSILLECTOMY  as a child    TOTAL COLECTOMY  2012    with colostomy and reversal        Family History   Problem Relation Age of Onset    Cancer Mother     Cancer Father            Social Connections:     Frequency of Communication with Friends and Family: Not on file    Frequency of Social Gatherings with Friends and Family: Not on file    Attends Orthodoxy Services: Not on file    Active Member of Clubs or Organizations: Not on file    Attends Club or Organization Meetings: Not on file    Marital Status: Not on file        Allergies   Allergen Reactions    Oxycodone-Acetaminophen Other (See Comments)     Hot and cold flashes. Nasal passages swelled. Vitals signs and nursing note reviewed. Patient Vitals for the past 4 hrs:   Temp Pulse Resp BP SpO2   07/06/22 1515 97.9 °F (36.6 °C) 71 16 (!) 143/63 97 %          Physical Exam  Vitals and nursing note reviewed. Constitutional:       Appearance: Normal appearance. HENT:      Head: Normocephalic and atraumatic. Right Ear: Tympanic membrane normal.      Left Ear: Tympanic membrane normal.      Nose: Nose normal.      Mouth/Throat:      Mouth: Mucous membranes are moist.   Eyes:      Extraocular Movements: Extraocular movements intact. Pupils: Pupils are equal, round, and reactive to light. Comments: No nystagmus   Cardiovascular:      Rate and Rhythm: Normal rate and regular rhythm. Pulses: Normal pulses. Heart sounds: Normal heart sounds. Pulmonary:      Effort: Pulmonary effort is normal.      Breath sounds: Normal breath sounds. Abdominal:      General: Abdomen is flat. Palpations: Abdomen is soft. Tenderness: There is no abdominal tenderness. Musculoskeletal:         General: No tenderness. Cervical back: Normal range of motion and neck supple. Skin:     General: Skin is warm and dry. Neurological:      General: No focal deficit present. Mental Status: He is alert and oriented to person, place, and time. Motor: No weakness.       Coordination: Coordination normal.      Gait: Gait normal.   Psychiatric:         Mood and Affect: Mood normal.          Procedures      Labs Reviewed   CBC - Abnormal; Notable for the following components:       Result Value    Hemoglobin 13.0 (*)     Hematocrit 40.1 (*)     MPV 9.2 (*)     All other components within normal limits   COMPREHENSIVE METABOLIC PANEL - Abnormal; Notable for the following components:    Anion Gap 6 (*)     BUN 25 (*)     AST 14 (*)     Globulin 4.0 (*)     Albumin/Globulin Ratio 0.8 (*)     All other components within normal limits        CT HEAD WO CONTRAST   Final Result      1. No acute intracranial abnormality. CPT code 77480                 NIH Stroke Scale  Interval: Baseline  Level of Consciousness (1a): Alert  LOC Questions (1b): Answers both correctly  LOC Commands (1c): Performs both tasks correctly  Best Gaze (2): Normal  Visual (3): No visual loss  Facial Palsy (4): Normal symmetrical movement  Motor Arm, Left (5a): No drift  Motor Arm, Right (5b): No drift  Motor Leg, Left (6a): No drift  Motor Leg, Right (6b): No drift  Limb Ataxia (7): Absent  Sensory (8): Normal  Best Language (9): No aphasia  Dysarthria (10): Normal  Extinction and Inattention (11): No abnormality  Total: 0                   Voice dictation software was used during the making of this note. This software is not perfect and grammatical and other typographical errors may be present. This note has not been completely proofread for errors.      Madelyn Zavala MD  07/06/22 [id number]

## 2022-07-06 NOTE — ED NOTES
I have reviewed discharge instructions with the patient. The patient verbalized understanding. Patient left ED via Discharge Method: ambulatory to Home with spouse. Opportunity for questions and clarification provided. Patient given 2 scripts. To continue your aftercare when you leave the hospital, you may receive an automated call from our care team to check in on how you are doing. This is a free service and part of our promise to provide the best care and service to meet your aftercare needs.  If you have questions, or wish to unsubscribe from this service please call [phone]. Thank you for Choosing our [facility] Emergency Department.         Arianne Alberto RN  07/06/22 7017

## 2022-11-10 ENCOUNTER — OFFICE VISIT (OUTPATIENT)
Dept: PRIMARY CARE CLINIC | Facility: CLINIC | Age: 77
End: 2022-11-10
Payer: MEDICARE

## 2022-11-10 VITALS
TEMPERATURE: 97.7 F | BODY MASS INDEX: 21.63 KG/M2 | OXYGEN SATURATION: 95 % | WEIGHT: 154.5 LBS | SYSTOLIC BLOOD PRESSURE: 132 MMHG | HEART RATE: 66 BPM | DIASTOLIC BLOOD PRESSURE: 76 MMHG | RESPIRATION RATE: 16 BRPM | HEIGHT: 71 IN

## 2022-11-10 DIAGNOSIS — G89.29 CHRONIC PAIN OF RIGHT KNEE: ICD-10-CM

## 2022-11-10 DIAGNOSIS — M25.561 CHRONIC PAIN OF RIGHT KNEE: ICD-10-CM

## 2022-11-10 DIAGNOSIS — Z79.899 ENCOUNTER FOR LONG-TERM (CURRENT) USE OF MEDICATIONS: ICD-10-CM

## 2022-11-10 DIAGNOSIS — Z76.89 ENCOUNTER TO ESTABLISH CARE: Primary | ICD-10-CM

## 2022-11-10 DIAGNOSIS — Z85.46 HISTORY OF PROSTATE CANCER: ICD-10-CM

## 2022-11-10 PROCEDURE — G8484 FLU IMMUNIZE NO ADMIN: HCPCS | Performed by: FAMILY MEDICINE

## 2022-11-10 PROCEDURE — G8420 CALC BMI NORM PARAMETERS: HCPCS | Performed by: FAMILY MEDICINE

## 2022-11-10 PROCEDURE — 1036F TOBACCO NON-USER: CPT | Performed by: FAMILY MEDICINE

## 2022-11-10 PROCEDURE — 1123F ACP DISCUSS/DSCN MKR DOCD: CPT | Performed by: FAMILY MEDICINE

## 2022-11-10 PROCEDURE — G8427 DOCREV CUR MEDS BY ELIG CLIN: HCPCS | Performed by: FAMILY MEDICINE

## 2022-11-10 PROCEDURE — 99203 OFFICE O/P NEW LOW 30 MIN: CPT | Performed by: FAMILY MEDICINE

## 2022-11-10 RX ORDER — CELECOXIB 200 MG/1
200 CAPSULE ORAL 2 TIMES DAILY PRN
COMMUNITY
Start: 2022-01-11 | End: 2023-01-11

## 2022-11-10 SDOH — ECONOMIC STABILITY: FOOD INSECURITY: WITHIN THE PAST 12 MONTHS, THE FOOD YOU BOUGHT JUST DIDN'T LAST AND YOU DIDN'T HAVE MONEY TO GET MORE.: PATIENT DECLINED

## 2022-11-10 SDOH — ECONOMIC STABILITY: FOOD INSECURITY: WITHIN THE PAST 12 MONTHS, YOU WORRIED THAT YOUR FOOD WOULD RUN OUT BEFORE YOU GOT MONEY TO BUY MORE.: PATIENT DECLINED

## 2022-11-10 ASSESSMENT — PATIENT HEALTH QUESTIONNAIRE - PHQ9
1. LITTLE INTEREST OR PLEASURE IN DOING THINGS: 0
SUM OF ALL RESPONSES TO PHQ QUESTIONS 1-9: 0
2. FEELING DOWN, DEPRESSED OR HOPELESS: 0
SUM OF ALL RESPONSES TO PHQ9 QUESTIONS 1 & 2: 0
SUM OF ALL RESPONSES TO PHQ QUESTIONS 1-9: 0

## 2022-11-10 ASSESSMENT — SOCIAL DETERMINANTS OF HEALTH (SDOH): HOW HARD IS IT FOR YOU TO PAY FOR THE VERY BASICS LIKE FOOD, HOUSING, MEDICAL CARE, AND HEATING?: PATIENT DECLINED
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
## 2023-06-07 PROBLEM — G89.29 CHRONIC PAIN OF RIGHT KNEE: Status: ACTIVE | Noted: 2023-06-07

## 2023-06-07 PROBLEM — K52.9 CHRONIC DIARRHEA: Status: ACTIVE | Noted: 2023-06-07

## 2023-06-07 PROBLEM — Z79.899 ENCOUNTER FOR LONG-TERM (CURRENT) USE OF MEDICATIONS: Status: ACTIVE | Noted: 2023-06-07

## 2023-06-07 PROBLEM — M79.604 RIGHT LEG PAIN: Status: ACTIVE | Noted: 2023-06-07

## 2023-06-07 PROBLEM — Z85.46 HISTORY OF PROSTATE CANCER: Status: ACTIVE | Noted: 2023-06-07

## 2023-06-07 PROBLEM — M25.561 CHRONIC PAIN OF RIGHT KNEE: Status: ACTIVE | Noted: 2023-06-07

## 2023-06-07 PROBLEM — K59.1 FUNCTIONAL DIARRHEA: Status: ACTIVE | Noted: 2023-06-07

## 2023-06-08 ENCOUNTER — TELEPHONE (OUTPATIENT)
Dept: PRIMARY CARE CLINIC | Facility: CLINIC | Age: 78
End: 2023-06-08

## 2023-06-08 NOTE — TELEPHONE ENCOUNTER
Called Kenzie Campbell to check him out of his appt and schedule his next one.  He did not answer and the phone just kept ringing

## 2024-08-22 RX ORDER — CELECOXIB 200 MG/1
CAPSULE ORAL
Qty: 60 CAPSULE | Refills: 0 | OUTPATIENT
Start: 2024-08-22

## (undated) DEVICE — (D)STRIP SKN CLSR 0.5X4IN WHT --

## (undated) DEVICE — KENDALL SCD EXPRESS SLEEVES, KNEE LENGTH, MEDIUM: Brand: KENDALL SCD

## (undated) DEVICE — INSUFFLATION NEEDLE: Brand: SURGINEEDLE

## (undated) DEVICE — [HIGH FLOW INSUFFLATOR,  DO NOT USE IF PACKAGE IS DAMAGED,  KEEP DRY,  KEEP AWAY FROM SUNLIGHT,  PROTECT FROM HEAT AND RADIOACTIVE SOURCES.]: Brand: PNEUMOSURE

## (undated) DEVICE — BUTTON SWITCH PENCIL BLADE ELECTRODE, HOLSTER: Brand: EDGE

## (undated) DEVICE — BLADELESS OPTICAL TROCAR WITH FIXATION CANNULA: Brand: VERSAPORT

## (undated) DEVICE — SUTURE VCRL SZ 4-0 L18IN ABSRB UD L19MM PS-2 3/8 CIR PRIM J496H

## (undated) DEVICE — AMD ANTIMICROBIAL GAUZE SPONGES 8 PLY USP TYPE VII: Brand: CURITY

## (undated) DEVICE — BLADELESS OPTICAL TROCAR WITH FIXATION CANNULA: Brand: VERSAONE

## (undated) DEVICE — SUTURE SZ 0 27IN 5/8 CIR UR-6  TAPER PT VIOLET ABSRB VICRYL J603H

## (undated) DEVICE — (D)PREP SKN CHLRAPRP APPL 26ML -- CONVERT TO ITEM 371833

## (undated) DEVICE — CLIP APPLIER WITH CLIP LOGIC TECHNOLOGY: Brand: ENDO CLIP III

## (undated) DEVICE — 2, DISPOSABLE SUCTION/IRRIGATOR WITHOUT DISPOSABLE TIP: Brand: STRYKEFLOW

## (undated) DEVICE — CONTAINER SPEC FRMLN 120ML --

## (undated) DEVICE — STAPLER XL L26CM ULT UNIV HNDL ENDO GIA

## (undated) DEVICE — BLUNT TROCAR WITH THREADED ANCHOR: Brand: VERSAONE

## (undated) DEVICE — VISUALIZATION SYSTEM: Brand: CLEARIFY

## (undated) DEVICE — 3M™ TEGADERM™ TRANSPARENT FILM DRESSING FRAME STYLE, 1624W, 2-3/8 IN X 2-3/4 IN (6 CM X 7 CM), 100/CT 4CT/CASE: Brand: 3M™ TEGADERM™

## (undated) DEVICE — TISSUE RETRIEVAL SYSTEM: Brand: INZII RETRIEVAL SYSTEM

## (undated) DEVICE — REM POLYHESIVE ADULT PATIENT RETURN ELECTRODE: Brand: VALLEYLAB

## (undated) DEVICE — BNDG ADHESIVE SHEER LF 1X3IN -- CONVERT TO ITEM 358043

## (undated) DEVICE — RELOAD STPL 45MM THCK TISS GRN W/ GRIPPING SURF TECHNOLOGY

## (undated) DEVICE — MASTISOL ADHESIVE LIQ 2/3ML

## (undated) DEVICE — UNIVERSAL FIXATION CANNULA: Brand: VERSAONE

## (undated) DEVICE — NDL HYPO BVL NSAF 25GX1IN LF --

## (undated) DEVICE — SOLUTION IRRIG 3000ML 0.9% SOD CHL FLX CONT 0797208] ICU MEDICAL INC]

## (undated) DEVICE — 2000CC GUARDIAN II: Brand: GUARDIAN

## (undated) DEVICE — BAG SPEC RETRV 275ML 10ML DISPOSABLE RELIACATCH

## (undated) DEVICE — E-Z CLEAN, PTFE COATED, ELECTROSURGICAL LAPAROSCOPIC ELECTRODE, J-HOOK, 33 CM., SINGLE-USE, FOR USE WITH HAND CONTROL PENCIL: Brand: MEGADYNE

## (undated) DEVICE — LAP CHOLE: Brand: MEDLINE INDUSTRIES, INC.

## (undated) DEVICE — LOGICUT SCISSOR LENGTH 320MM: Brand: LOGI - LAPAROSCOPIC INSTRUMENT SYSTEM